# Patient Record
Sex: FEMALE | Race: BLACK OR AFRICAN AMERICAN | Employment: UNEMPLOYED | URBAN - METROPOLITAN AREA
[De-identification: names, ages, dates, MRNs, and addresses within clinical notes are randomized per-mention and may not be internally consistent; named-entity substitution may affect disease eponyms.]

---

## 2021-03-25 ENCOUNTER — OFFICE VISIT (OUTPATIENT)
Dept: CARDIOLOGY CLINIC | Age: 61
End: 2021-03-25
Payer: COMMERCIAL

## 2021-03-25 VITALS
WEIGHT: 199.4 LBS | OXYGEN SATURATION: 97 % | HEART RATE: 58 BPM | SYSTOLIC BLOOD PRESSURE: 136 MMHG | RESPIRATION RATE: 16 BRPM | BODY MASS INDEX: 31.3 KG/M2 | HEIGHT: 67 IN | TEMPERATURE: 97.2 F | DIASTOLIC BLOOD PRESSURE: 71 MMHG

## 2021-03-25 DIAGNOSIS — R01.1 HEART MURMUR: Primary | ICD-10-CM

## 2021-03-25 PROCEDURE — 99215 OFFICE O/P EST HI 40 MIN: CPT | Performed by: INTERNAL MEDICINE

## 2021-03-25 RX ORDER — DICLOFENAC SODIUM 75 MG/1
TABLET, DELAYED RELEASE ORAL
COMMUNITY
Start: 2021-03-18 | End: 2021-07-28 | Stop reason: SDUPTHER

## 2021-03-25 RX ORDER — ESCITALOPRAM OXALATE 20 MG/1
TABLET ORAL
COMMUNITY
Start: 2021-01-09

## 2021-03-25 RX ORDER — HYDROXYZINE 25 MG/1
TABLET, FILM COATED ORAL AS NEEDED
COMMUNITY
End: 2021-05-20

## 2021-03-25 RX ORDER — ATORVASTATIN CALCIUM 10 MG/1
10 TABLET, FILM COATED ORAL DAILY
COMMUNITY
Start: 2021-03-16 | End: 2022-03-10 | Stop reason: DRUGHIGH

## 2021-03-25 NOTE — PATIENT INSTRUCTIONS
Testing Echo ( To be completed in just 1 months) Please call scheduling at [phone]  to schedule an appointment.   
 
 
**call office 3-5 days after testing is completed for results**

## 2021-03-25 NOTE — LETTER
3/25/2021 Patient: Nadia Babb YOB: 1960 Date of Visit: 3/25/2021 Reji Oliver MD 
2020 [address] [facility] [address] [place] [address] Via Fax: [phone] Dear Reji Oliver MD, Thank you for referring Ms. Nadia Babb to [address] [place] [address] for evaluation. My notes for this consultation are attached. If you have questions, please do not hesitate to call me. I look forward to following your patient along with you. Sincerely, Isac Mccauley MD

## 2021-03-25 NOTE — PROGRESS NOTES
[facility]    Ms. Fina Mcclure is a 29-year-old female with a history of depression, hyperlipidemia    Patient is here today for initial cardiac evaluation. She denies any prior history of MI or CHF. She was told that she has a cardiac murmur  She said that she was told she has a cardiac murmur as a childhood. She denies any palpitation, presyncope or syncope. She has a mother who had 2 valve replaced. She is concerned with her valve problems. She denies any PND or lower extremity swelling. She denies any symptoms that is concerning for angina or heart failure. She denies presyncope or syncope. She has some fatigue and tiredness but other than that no specific cardiac symptoms  Denies any nausea, vomiting, abdominal pain, fever, chills, sputum production. No hematuria or other bleeding complaints    Past Medical History:   Diagnosis Date    Depression     HLD (hyperlipidemia)     Tobacco abuse, in remission        Review of Systems:  Cardiac symptoms as noted above in HPI. All others negative. Denies skin rash, joint pain, blurring vision, photophobia, neck pain, hemoptysis, chronic cough, nausea, vomiting, hematuria, burning micturition, BRBPR, chronic headaches. Current Outpatient Medications   Medication Sig    atorvastatin (LIPITOR) 10 mg tablet Take 10 mg by mouth daily.  diclofenac EC (VOLTAREN) 75 mg EC tablet diclofenac sodium 75 mg tablet,delayed release   take 1 tablet by mouth twice a day    hydrOXYzine HCL (ATARAX) 25 mg tablet as needed.  escitalopram oxalate (LEXAPRO) 20 mg tablet escitalopram 20 mg tablet   take 1 tablet by mouth once daily     No current facility-administered medications for this visit. No past surgical history on file. Allergies and Sensitivities:  Allergies   Allergen Reactions    Pcn [Penicillins] Hives       Family History:  No family history on file.     Social History:  Social History     Tobacco Use    Smoking status: Former Smoker    Smokeless tobacco: Never Used   Substance Use Topics    Alcohol use: Yes     Frequency: 4 or more times a week     Drinks per session: 1 or 2    Drug use: Not on file     She  reports that she has quit smoking. She has never used smokeless tobacco.  She  reports current alcohol use. Physical Exam:  BP Readings from Last 3 Encounters:   03/25/21 136/71         Pulse Readings from Last 3 Encounters:   03/25/21 (!) 58          Wt Readings from Last 3 Encounters:   03/25/21 199 lb 6.4 oz (90.4 kg)       Constitutional: Oriented to person, place, and time. HENT: Head: Normocephalic and atraumatic. Eyes: Conjunctivae and extraocular motions are normal.   Neck: No JVD present. Carotid bruit is not appreciated. Cardiovascular: Regular rhythm. No murmur, gallop or rubs appreciated  Lung: Breath sounds normal. No respiratory distress. No ronchi or rales appreciated  Abdominal: No tenderness. No rebound and no guarding. Musculoskeletal: There is no lower extremity edema. No cynosis  Lymphadenopathy:  No cervical or supraclavicular adenopathy appriciated. Neurological: No gross motor deficit noted. Skin: No visible skin rash noted. No Ear discharge noted  Psychiatric: Normal mood and affect.    Good distal pulse    LABS:   @  Lab Results   Component Value Date/Time    WBC 4.1 03/05/2021 12:49 PM    HGB 13.2 03/05/2021 12:49 PM    HCT 42.1 03/05/2021 12:49 PM    PLATELET 117 20/12/2789 12:49 PM    MCV 89.4 03/05/2021 12:49 PM     Lab Results   Component Value Date/Time    Sodium 141 03/05/2021 12:49 PM    Potassium 3.9 03/05/2021 12:49 PM    Chloride 107 03/05/2021 12:49 PM    CO2 28 03/05/2021 12:49 PM    Glucose 80 03/05/2021 12:49 PM    BUN 15 03/05/2021 12:49 PM    Creatinine 0.9 03/05/2021 12:49 PM     Lipids Latest Ref Rng & Units 3/5/2021   Chol, Total 140 - 199 mg/dl 252(H)   HDL 40 - 96 mg/dl 53   LDL 0 - 130 mg/dl 178(H) Trig 29 - 150 mg/dl 104   Chol/HDL Ratio 0.0 - 4.4 Ratio 4. 8(H)     Lab Results   Component Value Date/Time    ALT (SGPT) 24 03/05/2021 12:49 PM     Lab Results   Component Value Date/Time    Hemoglobin A1c 5.4 03/05/2021 12:49 PM     Lab Results   Component Value Date/Time    TSH 0.675 03/05/2021 12:49 PM       EKG  12/2020: Sinus rhythm at 54 bpm.  No heart block or pause. No ST changes of ischemia    ECHO    STRESS TEST (EST, PHARM, NUC, ECHO etc)    CATHETERIZATION    IMPRESSION & PLAN:  56-year-old female with depression and hyperlipidemia    Hyperlipidemia: This is being managed by PCP. She recently has been started on atorvastatin. Recommend to continue same. Cardiac murmur:  According to patient she was told that she has a cardiac murmur since childhood. On exam today I was not able to appreciate significant murmur  She may have a innocent physiologic murmur  Considering strong family history of significant valvular heart disease, would proceed with echocardiogram to make sure she does not have any valvular heart disease    Currently patient does not appear to have any symptoms concerning for angina or heart failure. No evidence of fluid overload on exam.    Importance of diet and exercise was discussed with patient. This plan was discussed with patient who is in agreement. Thank you for allowing me to participate in patient care. Please feel free to call me if you have any question or concern. Mee Gaines MD  Please note: This document has been produced using voice recognition software. Unrecognized errors in transcription may be present.

## 2021-03-25 NOTE — PROGRESS NOTES
Silvana Murphy presents today for   Chief Complaint   Patient presents with    New Patient     murmur       Silvana Murphy preferred language for health care discussion is english/other. Personal Protective Equipment:   Personal Protective Equipment was used including: mask-surgical and hands-gloves. Patient was placed on no precaution(s). Patient was masked. Is someone accompanying this pt? no    Is the patient using any DME equipment during [address]? no    Depression Screening:  3 most recent PHQ Screens 3/25/2021   Little interest or pleasure in doing things More than half the days   Feeling down, depressed, irritable, or hopeless More than half the days   Total Score PHQ 2 4   Trouble falling or staying asleep, or sleeping too much More than half the days   Feeling tired or having little energy Several days   Poor appetite, weight loss, or overeating More than half the days   Trouble concentrating on things such as school, work, reading, or watching TV Nearly every day   Moving or speaking so slowly that other people could have noticed; or the opposite being so fidgety that others notice More than half the days   Thoughts of being better off dead, or hurting yourself in some way Not at all   How difficult have these problems made it for you to do your work, take care of your home and get along with others Not difficult at all       Learning Assessment:  No flowsheet data found. Abuse Screening:  No flowsheet data found. Fall Risk  No flowsheet data found. Pt currently taking Anticoagulant therapy? no    Coordination of Care:  1. Have you been to the ER, urgent care clinic since your last visit? Hospitalized since your last visit? no    2. Have you seen or consulted any other health care providers outside of the [address] [place] [address] since your last visit? Include any pap smears or colon screening.  no

## 2021-06-24 ENCOUNTER — OFFICE VISIT (OUTPATIENT)
Dept: CARDIOLOGY CLINIC | Age: 61
End: 2021-06-24
Payer: COMMERCIAL

## 2021-06-24 VITALS
HEART RATE: 71 BPM | TEMPERATURE: 97.3 F | SYSTOLIC BLOOD PRESSURE: 118 MMHG | RESPIRATION RATE: 16 BRPM | WEIGHT: 200 LBS | OXYGEN SATURATION: 98 % | BODY MASS INDEX: 31.32 KG/M2 | DIASTOLIC BLOOD PRESSURE: 58 MMHG

## 2021-06-24 DIAGNOSIS — E78.00 PURE HYPERCHOLESTEROLEMIA: ICD-10-CM

## 2021-06-24 DIAGNOSIS — I38 VHD (VALVULAR HEART DISEASE): Primary | ICD-10-CM

## 2021-06-24 PROCEDURE — 99213 OFFICE O/P EST LOW 20 MIN: CPT | Performed by: INTERNAL MEDICINE

## 2021-06-24 NOTE — LETTER
6/24/2021    Patient: Lizzette Russell   YOB: 1960   Date of Visit: 6/24/2021     Orlando Sellers MD  [address] [place] [address] [address] [id number]  Via Fax: [phone]    Dear Orlando Sellers MD,      Thank you for referring Ms. Lisandra Mcdonald to [address] [place] [address] for evaluation. My notes for this consultation are attached. If you have questions, please do not hesitate to call me. I look forward to following your patient along with you.       Sincerely,    Alhaji Ferreira MD

## 2021-06-24 NOTE — PROGRESS NOTES
Juanpablo Jewell presents today for   Chief Complaint   Patient presents with   [address] [facility] preferred language for health care discussion is english/other. Personal Protective Equipment:   Personal Protective Equipment was used including: mask-surgical and hands-gloves. Patient was placed on no precaution(s). Patient was masked. Precautions:   Patient currently on None  Patient currently roomed with door closed    Is someone accompanying this pt? no    Is the patient using any DME equipment during [address]? no    Depression Screening:  3 most recent PHQ Screens 6/24/2021   Little interest or pleasure in doing things Not at all   Feeling down, depressed, irritable, or hopeless Not at all   Total Score PHQ 2 0   Trouble falling or staying asleep, or sleeping too much -   Feeling tired or having little energy -   Poor appetite, weight loss, or overeating -   Trouble concentrating on things such as school, work, reading, or watching TV -   Moving or speaking so slowly that other people could have noticed; or the opposite being so fidgety that others notice -   Thoughts of being better off dead, or hurting yourself in some way -   How difficult have these problems made it for you to do your work, take care of your home and get along with others -       Learning Assessment:  No flowsheet data found. Abuse Screening:  Completed    Fall Risk  Completed    Pt currently taking Anticoagulant therapy? no    Coordination of Care:  1. Have you been to the ER, urgent care clinic since your last visit? Hospitalized since your last visit? no    2. Have you seen or consulted any other health care providers outside of the [address] [place] [address] since your last visit? Include any pap smears or colon screening.  no

## 2021-06-24 NOTE — PROGRESS NOTES
[facility]    Ms. Carolin Ng is a 63 y.o. female with a history of depression, hyperlipidemia    Patient is here today for follow-up cardiac evaluation. She denies any prior history of MI or CHF. She was told that she has a cardiac murmur  She has undergone echocardiogram since last visit. She denies any PND or lower extremity swelling. She denies any symptoms that is concerning for angina or heart failure. She denies presyncope or syncope. She has some fatigue and tiredness but other than that no specific cardiac symptoms  Denies any nausea, vomiting, abdominal pain, fever, chills, sputum production. No hematuria or other bleeding complaints    Past Medical History:   Diagnosis Date    Depression     Heart murmur     HLD (hyperlipidemia)     Sleep apnea     does not use cpap    Tobacco abuse, in remission        Review of Systems:  Cardiac symptoms as noted above in HPI. All others negative. Denies skin rash, joint pain, blurring vision, photophobia, neck pain, hemoptysis, chronic cough, nausea, vomiting, hematuria, burning micturition, BRBPR, chronic headaches. Current Outpatient Medications   Medication Sig    atorvastatin (LIPITOR) 10 mg tablet Take 10 mg by mouth daily.  bisacodyL (Dulcolax, bisacodyl,) 5 mg EC tablet Dulcolax (bisacodyl) 5 mg tablet,delayed release   Take 2 TABLETS as directed in written instructions for bowel prep    ergocalciferol (ERGOCALCIFEROL) 1,250 mcg (50,000 unit) capsule Vitamin D2 1,250 mcg (50,000 unit) capsule   take 1 capsule by mouth every week    traZODone (DESYREL) 50 mg tablet trazodone 50 mg tablet   take 1 tablet by mouth at bedtime    b complex vitamins tablet Take 1 Tablet by mouth daily.  ascorbic acid/collagen hydr (COLLAGEN PLUS VITAMIN C PO) Take  by mouth daily.  coenzyme Q-10 (Co Q-10) 200 mg capsule Take  by mouth daily.     diclofenac EC (VOLTAREN) 75 mg EC tablet diclofenac sodium 75 mg tablet,delayed release   take 1 tablet by mouth twice a day    escitalopram oxalate (LEXAPRO) 20 mg tablet escitalopram 20 mg tablet   take 1 tablet by mouth once daily     No current facility-administered medications for this visit. Past Surgical History:   Procedure Laterality Date    COLONOSCOPY N/A 5/21/2021    SCREENING COLONOSCOPY with hot snared polypectomy performed by Howard Zaman MD at [address] [place] [address] HX COLONOSCOPY      HX HEENT Left     retina re-attached & macular hole repair    HX HYSTERECTOMY      HX MYOMECTOMY         Allergies and Sensitivities:  Allergies   Allergen Reactions    Penicillins Hives       Family History:  No family history on file. Social History:  Social History     Tobacco Use    Smoking status: Former Smoker    Smokeless tobacco: Never Used   Substance Use Topics    Alcohol use: Yes    Drug use: Not on file     She  reports that she has quit smoking. She has never used smokeless tobacco.  She  reports current alcohol use. Physical Exam:  BP Readings from Last 3 Encounters:   06/24/21 (!) 118/58   05/21/21 110/64   05/17/21 (!) 146/75         Pulse Readings from Last 3 Encounters:   06/24/21 71   05/21/21 64   03/25/21 (!) 58          Wt Readings from Last 3 Encounters:   06/24/21 90.7 kg (200 lb)   05/21/21 89 kg (196 lb 3.4 oz)   05/17/21 90.3 kg (199 lb)       Constitutional: Oriented to person, place, and time. HENT: Head: Normocephalic and atraumatic. Neck: No JVD present. Carotid bruit is not appreciated. Cardiovascular: Regular rhythm. No  gallop or rubs appreciated. Systolic ejection murmur 2/6 heard mostly on left sternal border  Lung: Breath sounds normal. No respiratory distress. No ronchi or rales appreciated  Abdominal: No tenderness. No rebound and no guarding. Musculoskeletal: There is no lower extremity edema. No cynosis  Lymphadenopathy:  No cervical or supraclavicular adenopathy appriciated. Neurological: No gross motor deficit noted. Skin: No visible skin rash noted. No Ear discharge noted  Psychiatric: Normal mood and affect. Good distal pulse    LABS:   @  Lab Results   Component Value Date/Time    WBC 3.8 (L) 06/21/2021 10:59 AM    HGB 12.8 (L) 06/21/2021 10:59 AM    HCT 41.1 06/21/2021 10:59 AM    PLATELET 238 25/68/7238 10:59 AM    MCV 87.8 06/21/2021 10:59 AM     Lab Results   Component Value Date/Time    Sodium 143 06/21/2021 10:59 AM    Potassium 4.0 06/21/2021 10:59 AM    Chloride 110 (H) 06/21/2021 10:59 AM    CO2 28 06/21/2021 10:59 AM    Glucose 86 06/21/2021 10:59 AM    BUN 13 06/21/2021 10:59 AM    Creatinine 0.9 06/21/2021 10:59 AM     Lipids Latest Ref Rng & Units 6/21/2021 3/5/2021   Chol, Total 140 - 199 mg/dl 226(H) 252(H)   HDL 40 - 96 mg/dl 50 53   LDL 0 - 130 mg/dl 159(H) 178(H)   Trig 29 - 150 mg/dl 86 104   Chol/HDL Ratio 0.0 - 4.4 Ratio 4. 5(H) 4. 8(H)     Lab Results   Component Value Date/Time    ALT (SGPT) 52 06/21/2021 10:59 AM     Lab Results   Component Value Date/Time    Hemoglobin A1c 5.4 03/05/2021 12:49 PM     Lab Results   Component Value Date/Time    TSH 0.675 03/05/2021 12:49 PM       EKG  12/2020: Sinus rhythm at 54 bpm.  No heart block or pause. No ST changes of ischemia    ECHO (05/2021)  Left Ventricle Normal cavity size, wall thickness and systolic function (ejection fraction normal). The estimated EF is 55 - 60%. Visually measured ejection fraction. There is age-appropriate left ventricular diastolic function. Wall Scoring The left ventricular wall motion is normal.            Left Atrium Mildly dilated left atrium. Left Atrium volume index is 39.65 mL/m2. Right Ventricle Normal cavity size and global systolic function. Right Atrium Normal cavity size. Aortic Valve No stenosis. Probably trileaflet aortic valve. Mild aortic valve sclerosis with no evidence of reduced excursion. Mild aortic valve regurgitation. Mitral Valve No stenosis.  Mitral valve non-specific thickening. Mild regurgitation. Tricuspid Valve Normal valve structure and no stenosis. Mild regurgitation. Pulmonic Valve Normal valve structure and no stenosis. Mild to moderate regurgitation. Aorta Normal aortic root and ascending aorta. Pulmonary Artery Pulmonary arterial systolic pressure (PASP) is 45 mmHg. Pulmonary hypertension found to be mild. IVC/Hepatic Veins Normal central venous pressure (3 mmHg); IVC diameter is less than 21 mm and collapses more than 50% with respiration. STRESS TEST (EST, PHARM, NUC, ECHO etc)    CATHETERIZATION    IMPRESSION & PLAN:  63 y.o. female with depression and hyperlipidemia    Hyperlipidemia: This is being managed by PCP. She recently has been started on atorvastatin in 03/2021. Recent lipid profile noted. Discussed with the patient that dietary modification needed  She admits that she is taking atorvastatin probably only 3 times at a 1 week. She is going to make extra effort to take the medication on a daily basis. Repeat lipid profile in 3 months after taking medications regularly as prescribed every day    Valvular heart disease:  Echo in 05/2021 showed mild AI, mild TR, mild MR and UT  Discussed finding with the patient in detail today. Signs and symptoms of valvular heart disease and congestive heart failure discussed. Currently she has no cardiac symptoms. We recommend that she will need a repeat echocardiogram in 12 months to check on progression    Currently patient does not appear to have any symptoms concerning for angina or heart failure. No evidence of fluid overload on exam.    Importance of diet and exercise was discussed with patient. This plan was discussed with patient who is in agreement. Thank you for allowing me to participate in patient care. Please feel free to call me if you have any question or concern. Vale Pham MD  Please note: This document has been produced using voice recognition software. Unrecognized errors in transcription may be present.

## 2021-07-28 ENCOUNTER — OFFICE VISIT (OUTPATIENT)
Dept: FAMILY MEDICINE CLINIC | Age: 61
End: 2021-07-28
Payer: COMMERCIAL

## 2021-07-28 VITALS
WEIGHT: 201 LBS | RESPIRATION RATE: 16 BRPM | DIASTOLIC BLOOD PRESSURE: 74 MMHG | OXYGEN SATURATION: 99 % | HEIGHT: 67 IN | SYSTOLIC BLOOD PRESSURE: 115 MMHG | TEMPERATURE: 97.3 F | BODY MASS INDEX: 31.55 KG/M2 | HEART RATE: 65 BPM

## 2021-07-28 DIAGNOSIS — K21.9 GASTROESOPHAGEAL REFLUX DISEASE, UNSPECIFIED WHETHER ESOPHAGITIS PRESENT: Primary | ICD-10-CM

## 2021-07-28 DIAGNOSIS — M17.11 PRIMARY OSTEOARTHRITIS OF RIGHT KNEE: ICD-10-CM

## 2021-07-28 DIAGNOSIS — M85.80 OSTEOPENIA, UNSPECIFIED LOCATION: ICD-10-CM

## 2021-07-28 DIAGNOSIS — E55.9 VITAMIN D DEFICIENCY: ICD-10-CM

## 2021-07-28 PROCEDURE — 99203 OFFICE O/P NEW LOW 30 MIN: CPT | Performed by: NURSE PRACTITIONER

## 2021-07-28 RX ORDER — DICLOFENAC SODIUM 75 MG/1
TABLET, DELAYED RELEASE ORAL
Qty: 60 TABLET | Refills: 3 | Status: SHIPPED | OUTPATIENT
Start: 2021-07-28

## 2021-07-28 NOTE — PROGRESS NOTES
Ingrid Ann is a 63 y.o. female (: 1960) presenting to address:    Chief Complaint   Patient presents with    GI Problem    Joint Pain     needs refill on Diclofenac        Vitals:    21 0801   BP: 115/74   Pulse: 65   Resp: 16   Temp: 97.3 °F (36.3 °C)   TempSrc: Temporal   SpO2: 99%   Weight: 201 lb (91.2 kg)   Height: 5' 6.5\" (1.689 m)   PainSc:   0 - No pain       Hearing/Vision:   No exam data present    Learning Assessment:   No flowsheet data found. Depression Screening:     3 most recent PHQ Screens 2021   Little interest or pleasure in doing things Not at all   Feeling down, depressed, irritable, or hopeless Several days   Total Score PHQ 2 1   Trouble falling or staying asleep, or sleeping too much -   Feeling tired or having little energy -   Poor appetite, weight loss, or overeating -   Trouble concentrating on things such as school, work, reading, or watching TV -   Moving or speaking so slowly that other people could have noticed; or the opposite being so fidgety that others notice -   Thoughts of being better off dead, or hurting yourself in some way -   How difficult have these problems made it for you to do your work, take care of your home and get along with others -     Fall Risk Assessment:     Fall Risk Assessment, last 12 mths 2021   Able to walk? Yes   Fall in past 12 months? 1   Do you feel unsteady? 0   Are you worried about falling 1   Is TUG test greater than 12 seconds? 0   Is the gait abnormal? 0   Number of falls in past 12 months 1   Fall with injury? 1     Abuse Screening:     Abuse Screening Questionnaire 2021   Do you ever feel afraid of your partner? N   Are you in a relationship with someone who physically or mentally threatens you? N   Is it safe for you to go home? Y     Coordination of Care Questionaire:   1. Have you been to the ER, urgent care clinic since your last visit? Hospitalized since your last visit? NO    2.  Have you seen or consulted any other health care providers outside of the [address] [place] [address] since your last visit? Include any pap smears or colon screening. NO    Advanced Directive:   1. Do you have an Advanced Directive? NO    2. Would you like information on Advanced Directives?  NO

## 2021-07-28 NOTE — PROGRESS NOTES
HPI  Missael Simpson is a 63y.o. year old female who presents today to establish care. She has had ongoing issues with acid reflux/heartburn for a while and takes Tagamet regularly which seems to work the best for her. Had a colonoscopy back in May but does not see GI at all. Had US of abdomen done at the beginning of the month which showed a liver lesion that her old PCP told her needed further evaluation. Has right knee osteoarthritis and saw ortho in march who prescribed oral Voltaren which helps tremendously. Wanted to pursue conservative treatment. Unsure if she needs a new script for RX strength weekly D. She has started taking daily supplement. Told in the past by old PCP in [facility] that she had osteopenia. Past Medical History:   Diagnosis Date    Depression     HLD (hyperlipidemia)     Sleep apnea     does not use cpap    Tobacco abuse, in remission        Past Surgical History:   Procedure Laterality Date    COLONOSCOPY N/A 5/21/2021    SCREENING COLONOSCOPY with hot snared polypectomy performed by Angela Andrews MD at [address] [place] [address] HX COLONOSCOPY      HX HEENT Left     retina re-attached & macular hole repair    HX HYSTERECTOMY      HX MYOMECTOMY         Social History     Tobacco Use    Smoking status: Former Smoker    Smokeless tobacco: Never Used   Vaping Use    Vaping Use: Never used   Substance Use Topics    Alcohol use: Yes     Alcohol/week: 6.0 standard drinks     Types: 6 Glasses of wine per week    Drug use: Never         Current Outpatient Medications:     diclofenac EC (VOLTAREN) 75 mg EC tablet, Take 1 tablet by mouth twice a day, Disp: 60 Tablet, Rfl: 3    ergocalciferol (ERGOCALCIFEROL) 1,250 mcg (50,000 unit) capsule, Vitamin D2 1,250 mcg (50,000 unit) capsule  take 1 capsule by mouth every week, Disp: , Rfl:     traZODone (DESYREL) 50 mg tablet, as needed. , Disp: , Rfl:     b complex vitamins tablet, Take 1 Tablet by mouth daily. , Disp: , Rfl:     ascorbic acid/collagen hydr (COLLAGEN PLUS VITAMIN C PO), Take  by mouth daily. , Disp: , Rfl:     coenzyme Q-10 (Co Q-10) 200 mg capsule, Take  by mouth daily. , Disp: , Rfl:     atorvastatin (LIPITOR) 10 mg tablet, Take 10 mg by mouth daily. , Disp: , Rfl:     escitalopram oxalate (LEXAPRO) 20 mg tablet, escitalopram 20 mg tablet  take 1 tablet by mouth once daily, Disp: , Rfl:     bisacodyL (Dulcolax, bisacodyl,) 5 mg EC tablet, Dulcolax (bisacodyl) 5 mg tablet,delayed release  Take 2 TABLETS as directed in written instructions for bowel prep (Patient not taking: Reported on 7/28/2021), Disp: , Rfl:      Allergies   Allergen Reactions    Penicillins Hives       Review of Systems   Constitutional: Negative for chills, fever and malaise/fatigue. Respiratory: Negative for cough and shortness of breath. Cardiovascular: Negative for chest pain, palpitations and leg swelling. Gastrointestinal: Positive for heartburn. Negative for abdominal pain, constipation, diarrhea, nausea and vomiting.        + bloating   Musculoskeletal: Positive for joint pain (right knee OA). PE  /74   Pulse 65   Temp 97.3 °F (36.3 °C) (Temporal)   Resp 16   Ht 5' 6.5\" (1.689 m)   Wt 201 lb (91.2 kg)   SpO2 99%   BMI 31.96 kg/m²     Physical Exam  Vitals reviewed. Constitutional:       General: She is not in acute distress. Appearance: Normal appearance. HENT:      Head: Normocephalic and atraumatic. Cardiovascular:      Rate and Rhythm: Normal rate and regular rhythm. Heart sounds: S1 normal and S2 normal. No murmur heard. No friction rub. No gallop. No S4 sounds. Pulmonary:      Effort: Pulmonary effort is normal.      Breath sounds: Normal breath sounds. No wheezing, rhonchi or rales. Abdominal:      General: Bowel sounds are normal. There is no distension. Palpations: Abdomen is soft. There is no hepatomegaly or splenomegaly. Tenderness:  There is no abdominal tenderness. There is no guarding or rebound. Musculoskeletal:      Right lower leg: No edema. Left lower leg: No edema. Skin:     General: Skin is warm and dry. Capillary Refill: Capillary refill takes less than 2 seconds. Neurological:      Mental Status: She is alert and oriented to person, place, and time. Assessment/Plan  1. GERD  Continue tagamet  Work on tapering off of Diclofenac and avoid NSAIDs as able  Avoid trigger foods  Small meals  No laying down after eating for at least 1-2 hours  Refer to GI for further evaluation    2. R knee OA  Decrease PO Voltaren  OTC topical Voltaren PRN  FU with ortho if needed  Voltaren refilled    3.  Osteopenia, vitamin D deficiency  DEXA

## 2021-08-04 ENCOUNTER — HOSPITAL ENCOUNTER (OUTPATIENT)
Dept: BONE DENSITY | Age: 61
Discharge: HOME | End: 2021-08-04
Attending: NURSE PRACTITIONER
Payer: COMMERCIAL

## 2021-08-04 DIAGNOSIS — M85.89 DISAPPEARING BONE DISEASE: ICD-10-CM

## 2021-08-04 DIAGNOSIS — M85.80 OSTEOPENIA, UNSPECIFIED LOCATION: ICD-10-CM

## 2021-08-04 PROCEDURE — 77080 DXA BONE DENSITY AXIAL: CPT

## 2021-08-05 RX ORDER — ALENDRONATE SODIUM 70 MG/1
70 TABLET ORAL
Qty: 4 TABLET | Refills: 11 | Status: SHIPPED | OUTPATIENT
Start: 2021-08-05 | End: 2022-07-29

## 2021-08-05 NOTE — PROGRESS NOTES
Call - bone density scan shows osteoporosis. Would like to start her on a once a week medication called Fosamax. Needs to take it with a full 8oz glass of water and sit upright for at least 30 minutes after taking otherwise she can get bad acid reflux.

## 2021-08-18 ENCOUNTER — DOCUMENTATION ONLY (OUTPATIENT)
Dept: PULMONOLOGY | Age: 61
End: 2021-08-18

## 2022-03-10 ENCOUNTER — OFFICE VISIT (OUTPATIENT)
Dept: CARDIOLOGY CLINIC | Age: 62
End: 2022-03-10
Payer: COMMERCIAL

## 2022-03-10 VITALS
SYSTOLIC BLOOD PRESSURE: 124 MMHG | OXYGEN SATURATION: 98 % | DIASTOLIC BLOOD PRESSURE: 63 MMHG | RESPIRATION RATE: 16 BRPM | HEART RATE: 63 BPM | TEMPERATURE: 97.2 F | BODY MASS INDEX: 32.75 KG/M2 | WEIGHT: 206 LBS

## 2022-03-10 DIAGNOSIS — I38 VHD (VALVULAR HEART DISEASE): ICD-10-CM

## 2022-03-10 DIAGNOSIS — R01.1 HEART MURMUR: Primary | ICD-10-CM

## 2022-03-10 DIAGNOSIS — E78.00 PURE HYPERCHOLESTEROLEMIA: ICD-10-CM

## 2022-03-10 PROCEDURE — 99214 OFFICE O/P EST MOD 30 MIN: CPT | Performed by: INTERNAL MEDICINE

## 2022-03-10 RX ORDER — ATORVASTATIN CALCIUM 20 MG/1
20 TABLET, FILM COATED ORAL DAILY
Qty: 90 TABLET | Refills: 3 | Status: SHIPPED | OUTPATIENT
Start: 2022-03-10

## 2022-03-10 NOTE — PROGRESS NOTES
Identified pt with two pt identifiers(name and ). Reviewed record in preparation for visit and have obtained necessary documentation. David Phelps presents today for   Chief Complaint   Patient presents with    Follow-up     9m       Pt c/o FATIGUE/WEAKNESS. David Phelps preferred language for health care discussion is english/other. Personal Protective Equipment:   Personal Protective Equipment was used including: mask-surgical and hands-gloves. Patient was placed on no precaution(s). Patient was masked. Precautions:   Patient currently on None  Patient currently roomed with door closed. Is someone accompanying this pt? no    Is the patient using any DME equipment during  [address]? no    Depression Screening:  3 most recent PHQ Screens 2021   Little interest or pleasure in doing things Not at all   Feeling down, depressed, irritable, or hopeless Several days   Total Score PHQ 2 1   Trouble falling or staying asleep, or sleeping too much More than half the days   Feeling tired or having little energy Several days   Poor appetite, weight loss, or overeating More than half the days   Feeling bad about yourself - or that you are a failure or have let yourself or your family down More than half the days   Trouble concentrating on things such as school, work, reading, or watching TV Not at all   Moving or speaking so slowly that other people could have noticed; or the opposite being so fidgety that others notice Not at all   Thoughts of being better off dead, or hurting yourself in some way Not at all   PHQ 9 Score 8   How difficult have these problems made it for you to do your work, take care of your home and get along with others -       Learning Assessment:  No flowsheet data found. Abuse Screening:  Abuse Screening Questionnaire 2021   Do you ever feel afraid of your partner? N   Are you in a relationship with someone who physically or mentally threatens you?  N   Is it safe for you to go home? Y       Fall Risk  Fall Risk Assessment, last 12 mths 7/28/2021   Able to walk? Yes   Fall in past 12 months? 1   Do you feel unsteady? 0   Are you worried about falling 1   Is TUG test greater than 12 seconds? 0   Is the gait abnormal? 0   Number of falls in past 12 months 1   Fall with injury? 1       Pt currently taking Anticoagulant therapy? no  Pt currently taking Antiplatelet therapy? no    Coordination of Care:  1. Have you been to the ER, urgent care clinic since your last visit? Hospitalized since your last visit? no    2. Have you seen or consulted any other health care providers outside of the [address] [place] [address] since your last visit? Include any pap smears or colon screening. no      Please see Red banners under Allergies and Med Rec to remove outside inquires. All correct information has been verified with patient and added to chart.      Medication's patient's would liked removed has been marked not taking to be removed per Verbal order and read back per Anjali Valero MD

## 2022-03-10 NOTE — PROGRESS NOTES
[facility]    Ms. Ankur Luis is a 57 y.o. female with a history of depression, hyperlipidemia    Patient is here today for follow-up cardiac evaluation. She denies any prior history of MI or CHF. She denies any PND or lower extremity swelling. She denies any symptoms that is concerning for angina or heart failure. She denies presyncope or syncope. She admits to sedentary lifestyle. She does not perform regular exercise  Denies any nausea, vomiting, abdominal pain, fever, chills, sputum production. No hematuria or other bleeding complaints    Past Medical History:   Diagnosis Date    Depression     HLD (hyperlipidemia)     Sleep apnea     does not use cpap    Tobacco abuse, in remission        Review of Systems:  Cardiac symptoms as noted above in HPI. All others negative. Denies skin rash, joint pain, blurring vision, photophobia, neck pain, hemoptysis, chronic cough, nausea, vomiting, hematuria, burning micturition, BRBPR, chronic headaches. Current Outpatient Medications   Medication Sig    coenzyme Q-10 (Co Q-10) 200 mg capsule Take  by mouth daily.  atorvastatin (LIPITOR) 10 mg tablet Take 10 mg by mouth daily.  alendronate (FOSAMAX) 70 mg tablet Take 1 Tablet by mouth every seven (7) days for 52 doses.  diclofenac EC (VOLTAREN) 75 mg EC tablet Take 1 tablet by mouth twice a day    bisacodyL (Dulcolax, bisacodyl,) 5 mg EC tablet Dulcolax (bisacodyl) 5 mg tablet,delayed release   Take 2 TABLETS as directed in written instructions for bowel prep (Patient not taking: Reported on 7/28/2021)    ergocalciferol (ERGOCALCIFEROL) 1,250 mcg (50,000 unit) capsule Vitamin D2 1,250 mcg (50,000 unit) capsule   take 1 capsule by mouth every week    traZODone (DESYREL) 50 mg tablet as needed.  b complex vitamins tablet Take 1 Tablet by mouth daily.     ascorbic acid/collagen hydr (COLLAGEN PLUS VITAMIN C PO) Take  by mouth daily.    escitalopram oxalate (LEXAPRO) 20 mg tablet escitalopram 20 mg tablet   take 1 tablet by mouth once daily     No current facility-administered medications for this visit. Past Surgical History:   Procedure Laterality Date    COLONOSCOPY N/A 5/21/2021    SCREENING COLONOSCOPY with hot snared polypectomy performed by Georgette Friend MD at [address] [place] [address] HX COLONOSCOPY      HX HEENT Left     retina re-attached & macular hole repair    HX HYSTERECTOMY      HX MYOMECTOMY         Allergies and Sensitivities:  Allergies   Allergen Reactions    Penicillins Hives       Family History:  Family History   Problem Relation Age of Onset    Glaucoma Mother     Arthritis Mother     High Cholesterol Mother     Glaucoma Father     Arthritis Father     Prostate Cancer Father     Eczema Sister     High Cholesterol Sister     Arthritis Sister     Pancreatic Cancer Paternal Aunt     Breast Cancer Paternal Aunt     Prostate Cancer Paternal Uncle        Social History:  Social History     Tobacco Use    Smoking status: Former Smoker    Smokeless tobacco: Never Used   Vaping Use    Vaping Use: Never used   Substance Use Topics    Alcohol use: Yes     Alcohol/week: 6.0 standard drinks     Types: 6 Glasses of wine per week    Drug use: Never     She  reports that she has quit smoking. She has never used smokeless tobacco.  She  reports current alcohol use of about 6.0 standard drinks of alcohol per week. Physical Exam:  BP Readings from Last 3 Encounters:   03/10/22 124/63   07/28/21 115/74   06/24/21 (!) 118/58         Pulse Readings from Last 3 Encounters:   03/10/22 63   07/28/21 65   06/24/21 71          Wt Readings from Last 3 Encounters:   03/10/22 93.4 kg (206 lb)   07/28/21 91.2 kg (201 lb)   06/24/21 90.7 kg (200 lb)       Constitutional: Oriented to person, place, and time. HENT: Head: Normocephalic and atraumatic. Neck: No JVD present. Cardiovascular: Regular rhythm.    No gallop or rubs appreciated. No obvious murmur appreciated today  Lung: Breath sounds normal. No respiratory distress. No ronchi or rales appreciated  Abdominal: No tenderness. No rebound and no guarding. Musculoskeletal: There is no lower extremity edema. No cynosis    LABS:   @  Lab Results   Component Value Date/Time    WBC 3.8 (L) 06/21/2021 10:59 AM    HGB 12.8 (L) 06/21/2021 10:59 AM    HCT 41.1 06/21/2021 10:59 AM    PLATELET 355 88/23/6149 10:59 AM    MCV 87.8 06/21/2021 10:59 AM     Lab Results   Component Value Date/Time    Sodium 143 06/21/2021 10:59 AM    Potassium 4.0 06/21/2021 10:59 AM    Chloride 110 (H) 06/21/2021 10:59 AM    CO2 28 06/21/2021 10:59 AM    Glucose 86 06/21/2021 10:59 AM    BUN 13 06/21/2021 10:59 AM    Creatinine 0.9 06/21/2021 10:59 AM     Lipids Latest Ref Rng & Units 6/21/2021 3/5/2021   Chol, Total 140 - 199 mg/dl 226(H) 252(H)   HDL 40 - 96 mg/dl 50 53   LDL 0 - 130 mg/dl 159(H) 178(H)   Trig 29 - 150 mg/dl 86 104   Chol/HDL Ratio 0.0 - 4.4 Ratio 4. 5(H) 4. 8(H)     Lab Results   Component Value Date/Time    ALT (SGPT) 52 06/21/2021 10:59 AM     Lab Results   Component Value Date/Time    Hemoglobin A1c 5.4 03/05/2021 12:49 PM     Lab Results   Component Value Date/Time    TSH 0.675 03/05/2021 12:49 PM       EKG  12/2020: Sinus rhythm at 54 bpm.  No heart block or pause. No ST changes of ischemia    ECHO (05/2021)  Left Ventricle Normal cavity size, wall thickness and systolic function (ejection fraction normal). The estimated EF is 55 - 60%. Visually measured ejection fraction. There is age-appropriate left ventricular diastolic function. Wall Scoring The left ventricular wall motion is normal.            Left Atrium Mildly dilated left atrium. Left Atrium volume index is 39.65 mL/m2. Right Ventricle Normal cavity size and global systolic function. Right Atrium Normal cavity size. Aortic Valve No stenosis. Probably trileaflet aortic valve.  Mild aortic valve sclerosis with no evidence of reduced excursion. Mild aortic valve regurgitation. Mitral Valve No stenosis. Mitral valve non-specific thickening. Mild regurgitation. Tricuspid Valve Normal valve structure and no stenosis. Mild regurgitation. Pulmonic Valve Normal valve structure and no stenosis. Mild to moderate regurgitation. Aorta Normal aortic root and ascending aorta. Pulmonary Artery Pulmonary arterial systolic pressure (PASP) is 45 mmHg. Pulmonary hypertension found to be mild. IVC/Hepatic Veins Normal central venous pressure (3 mmHg); IVC diameter is less than 21 mm and collapses more than 50% with respiration. STRESS TEST (EST, PHARM, NUC, ECHO etc)    CATHETERIZATION    IMPRESSION & PLAN:  57 y.o. female with depression and hyperlipidemia    Hyperlipidemia: This is being managed by PCP. She recently has been started on atorvastatin in 03/2021. Patient admits that she has not been taking atorvastatin for last 6 months. We will repeat fasting lipid profile. Will ask patient to start taking atorvastatin 20 mg daily. Importance of medication discussed    Valvular heart disease:  Echo in 05/2021 showed mild AI, mild TR, mild MR and SD  We will repeat limited echocardiogram to evaluate valvular heart disease  No fluid overload on exam today    Currently patient does not appear to have any symptoms concerning for angina or heart failure. No evidence of fluid overload on exam.    Importance of diet and exercise was discussed with patient. This plan was discussed with patient who is in agreement. Thank you for allowing me to participate in patient care. Please feel free to call me if you have any question or concern. Shen Kowalski MD  Please note: This document has been produced using voice recognition software. Unrecognized errors in transcription may be present.

## 2022-03-10 NOTE — LETTER
3/10/2022    Patient: Krys Prado   YOB: 1960   Date of Visit: 3/10/2022     Alisha Franklin NP  [address] [address]  Via Fax: [phone]    Dear Alisha Franklin NP,      Thank you for referring Ms. Camila Long to [address] [place] [address] for evaluation. My notes for this consultation are attached. If you have questions, please do not hesitate to call me. I look forward to following your patient along with you.       Sincerely,    Graciela Kang MD

## 2022-03-10 NOTE — PATIENT INSTRUCTIONS
Testing   Echo**call office 3-5 days after testing is completed for results**     New Medication/Medication Changes  lipitor 20 mg daily     **please allow 24-48 hrs for medication to be escribed to pharmacy** If you need any refills on medications please contact your pharmacy so that the request can be escribed to the provider for review.     Labs  Fasting lipid in 3-6 months    No appointment required for lab work  [address] [facility]  Hours are Mon-Fri 7:00 am-3:30 pm  **closed from 12:30 pm-1pm daily**

## 2022-12-07 ENCOUNTER — TELEPHONE (OUTPATIENT)
Dept: CARDIOLOGY CLINIC | Age: 62
End: 2022-12-07

## 2022-12-08 ENCOUNTER — OFFICE VISIT (OUTPATIENT)
Dept: CARDIOLOGY CLINIC | Age: 62
End: 2022-12-08

## 2022-12-08 VITALS
HEART RATE: 58 BPM | DIASTOLIC BLOOD PRESSURE: 68 MMHG | TEMPERATURE: 98 F | SYSTOLIC BLOOD PRESSURE: 116 MMHG | WEIGHT: 200 LBS | BODY MASS INDEX: 32.28 KG/M2

## 2022-12-08 DIAGNOSIS — E78.00 PURE HYPERCHOLESTEROLEMIA: Primary | ICD-10-CM

## 2022-12-08 RX ORDER — PANTOPRAZOLE SODIUM 20 MG/1
20 TABLET, DELAYED RELEASE ORAL DAILY
Qty: 90 TABLET | Refills: 0 | Status: SHIPPED | OUTPATIENT
Start: 2022-12-08

## 2022-12-08 RX ORDER — ATORVASTATIN CALCIUM 20 MG/1
20 TABLET, FILM COATED ORAL DAILY
Qty: 90 TABLET | Refills: 2 | Status: SHIPPED | OUTPATIENT
Start: 2022-12-08

## 2022-12-08 NOTE — LETTER
12/8/2022    Patient: Regan Chatterjee   YOB: 1960   Date of Visit: 12/8/2022     Lark Lefort, NP  [address] [address]  Via Fax: [phone]    Dear Lark Lefort, NP,      Thank you for referring Ms. Gillian Waller to [address] [place] [address] for evaluation. My notes for this consultation are attached. If you have questions, please do not hesitate to call me. I look forward to following your patient along with you.       Sincerely,    Citlali Perez MD

## 2022-12-08 NOTE — PROGRESS NOTES
[facility]    Ms. Joshua Edouard is a 60 y.o. female with a history of depression, hyperlipidemia    Patient is here today for follow-up cardiac evaluation. She denies any prior history of MI or CHF. Patient denies any symptoms concerning for angina or heart failure. She denies any lower extremity swelling. She is able to perform her usual daily living without any limitation  Patient has been experiencing some epigastric burning discomfort especially after she eats food. She cannot tell any particular type of food makes his symptoms. Feels like her reflux. Does not have any chest pain or epigastric pain with exertional activity. Past Medical History:   Diagnosis Date    Depression     HLD (hyperlipidemia)     Sleep apnea     does not use cpap    Tobacco abuse, in remission        Review of Systems:  Cardiac symptoms as noted above in HPI. All others negative. Denies skin rash, joint pain, blurring vision, photophobia, neck pain, hemoptysis, chronic cough, nausea, vomiting, hematuria, burning micturition, BRBPR, chronic headaches. Current Outpatient Medications   Medication Sig    atorvastatin (LIPITOR) 20 mg tablet Take 1 Tablet by mouth daily. diclofenac EC (VOLTAREN) 75 mg EC tablet Take 1 tablet by mouth twice a day    bisacodyL (DULCOLAX) 5 mg EC tablet Dulcolax (bisacodyl) 5 mg tablet,delayed release   Take 2 TABLETS as directed in written instructions for bowel prep (Patient not taking: No sig reported)    ergocalciferol (ERGOCALCIFEROL) 1,250 mcg (50,000 unit) capsule Vitamin D2 1,250 mcg (50,000 unit) capsule   take 1 capsule by mouth every week    traZODone (DESYREL) 50 mg tablet as needed. b complex vitamins tablet Take 1 Tablet by mouth daily. ascorbic acid/collagen hydr (COLLAGEN PLUS VITAMIN C PO) Take  by mouth daily. coenzyme Q-10 (CO Q-10) 200 mg capsule Take  by mouth daily.     escitalopram oxalate (LEXAPRO) 20 mg tablet escitalopram 20 mg tablet   take 1 tablet by mouth once daily     No current facility-administered medications for this visit. Past Surgical History:   Procedure Laterality Date    COLONOSCOPY N/A 5/21/2021    SCREENING COLONOSCOPY with hot snared polypectomy performed by Jovon Gutierrez MD at [facility] ENDOSCOPY    HX COLONOSCOPY      HX HEENT Left     retina re-attached & macular hole repair    HX HYSTERECTOMY      HX MYOMECTOMY         Allergies and Sensitivities:  Allergies   Allergen Reactions    Penicillins Hives       Family History:  Family History   Problem Relation Age of Onset    Glaucoma Mother     Arthritis Mother     High Cholesterol Mother     Glaucoma Father     Arthritis Father     Prostate Cancer Father     Eczema Sister     High Cholesterol Sister     Arthritis Sister     Pancreatic Cancer Paternal Aunt     Breast Cancer Paternal Aunt     Prostate Cancer Paternal Uncle        Social History:  Social History     Tobacco Use    Smoking status: Former    Smokeless tobacco: Never   Vaping Use    Vaping Use: Never used   Substance Use Topics    Alcohol use: Yes     Alcohol/week: 6.0 standard drinks     Types: 6 Glasses of wine per week    Drug use: Never     She  reports that she has quit smoking. She has never used smokeless tobacco.  She  reports current alcohol use of about 6.0 standard drinks per week. Physical Exam:  BP Readings from Last 3 Encounters:   03/10/22 124/63   07/28/21 115/74   06/24/21 (!) 118/58         Pulse Readings from Last 3 Encounters:   03/10/22 63   07/28/21 65   06/24/21 71          Wt Readings from Last 3 Encounters:   12/08/22 90.7 kg (200 lb)   12/08/22 93.4 kg (206 lb)   03/10/22 93.4 kg (206 lb)       Constitutional: Oriented to person, place, and time. HENT: Head: Normocephalic and atraumatic. Neck: No JVD present. Cardiovascular: Regular rhythm. No  gallop or rubs appreciated.   No obvious murmur appreciated today  Lung: Breath sounds normal. No respiratory distress. No ronchi or rales appreciated  Abdominal: No tenderness. No rebound and no guarding. Musculoskeletal: There is no lower extremity edema. No cynosis    LABS:   @  Lab Results   Component Value Date/Time    WBC 3.8 (L) 06/21/2021 10:59 AM    HGB 12.8 (L) 06/21/2021 10:59 AM    HCT 41.1 06/21/2021 10:59 AM    PLATELET 640 85/22/0264 10:59 AM    MCV 87.8 06/21/2021 10:59 AM     Lab Results   Component Value Date/Time    Sodium 143 06/21/2021 10:59 AM    Potassium 4.0 06/21/2021 10:59 AM    Chloride 110 (H) 06/21/2021 10:59 AM    CO2 28 06/21/2021 10:59 AM    Glucose 86 06/21/2021 10:59 AM    BUN 13 06/21/2021 10:59 AM    Creatinine 0.9 06/21/2021 10:59 AM     Lipids Latest Ref Rng & Units 6/21/2021 3/5/2021   Chol, Total 140 - 199 mg/dl 226(H) 252(H)   HDL 40 - 96 mg/dl 50 53   LDL 0 - 130 mg/dl 159(H) 178(H)   Trig 29 - 150 mg/dl 86 104   Chol/HDL Ratio 0.0 - 4.4 Ratio 4. 5(H) 4. 8(H)     Lab Results   Component Value Date/Time    ALT (SGPT) 52 06/21/2021 10:59 AM     Lab Results   Component Value Date/Time    Hemoglobin A1c 5.4 03/05/2021 12:49 PM     Lab Results   Component Value Date/Time    TSH 0.675 03/05/2021 12:49 PM       EKG  12/2020: Sinus rhythm at 54 bpm.  No heart block or pause. No ST changes of ischemia    ECHO ADULT COMPLETE 12/08/2022 12/8/2022  Interpretation Summary    Left Ventricle: Normal left ventricular systolic function with a visually estimated EF of 55 - 60%. Left ventricle size is normal. Normal wall thickness. Normal wall motion. Normal diastolic function. Right Ventricle: Right ventricle size is normal. Normal systolic function. TAPSE is normal.    Aortic Valve: Mild regurgitation. AV PHT is 548.7 millisecond. No stenosis. AV peak velocity is 2.3 m/s. Mitral Valve: Mild regurgitation. No stenosis noted. Tricuspid Valve: Mild regurgitation. No stenosis noted. Pulmonic Valve: Mild regurgitation with multiple jets.     Pulmonary Arteries: Pulmonary hypertension not present. The estimated PASP is 32 mmHg. STRESS TEST (EST, PHARM, NUC, ECHO etc)    CATHETERIZATION    IMPRESSION & PLAN:  60 y.o. female with depression and hyperlipidemia    Hyperlipidemia: This is being managed by PCP. She was started on atorvastatin in 03/2021. Patient tells me that she is not taking atorvastatin again. I have advised her to restart atorvastatin. Is important that she takes atorvastatin for her lipid-lowering goal    Valvular heart disease:  Echo in 05/2021 showed mild AI, mild TR, mild MR and LA  Echo in 12/2022 with mild AI, mild TR and MR. No fluid overload on exam today    GERD: Patient is complaining of symptoms highly concerning for GERD. I will start patient on Protonix 20 mg daily. Have asked patient to follow-up with PCP in consider GI referral.    Currently patient does not appear to have any symptoms concerning for angina or heart failure. No evidence of fluid overload on exam.    This plan was discussed with patient who is in agreement. Thank you for allowing me to participate in patient care. Please feel free to call me if you have any question or concern. Kristi Lesch, MD  Please note: This document has been produced using voice recognition software. Unrecognized errors in transcription may be present.

## 2022-12-08 NOTE — PATIENT INSTRUCTIONS
New Medication/Medication Changes    Start Protonix 20 mg take 1 tab daily for 3 months     Start Lipitor 20 mg take 1 tab every night at bedtime     **please allow 24-48 hrs for medication to be escribed to pharmacy** If you need any refills on medications please contact your pharmacy so that the request can be escribed to the provider for review.

## 2022-12-08 NOTE — PROGRESS NOTES
Identified pt with two pt identifiers(name and ). Reviewed record in preparation for visit and have obtained necessary documentation. Jannet Brock presents today for No chief complaint on file. Pt denies DIZZINESS, SOB, CHEST PAIN/ PRESSURE, FATIGUE/WEAKNESS, HEADACHES, SWELLING. Jannet Brock preferred language for health care discussion is english/other. Personal Protective Equipment:   Personal Protective Equipment was used including: mask-surgical and hands-gloves. Patient was placed on no precaution(s). Patient was masked. Precautions:   Patient currently on None  Patient currently roomed with door closed. Is someone accompanying this pt? no    Is the patient using any DME equipment during [address]? no    Depression Screening:  3 most recent PHQ Screens 2021   Little interest or pleasure in doing things Not at all   Feeling down, depressed, irritable, or hopeless Several days   Total Score PHQ 2 1   Trouble falling or staying asleep, or sleeping too much More than half the days   Feeling tired or having little energy Several days   Poor appetite, weight loss, or overeating More than half the days   Feeling bad about yourself - or that you are a failure or have let yourself or your family down More than half the days   Trouble concentrating on things such as school, work, reading, or watching TV Not at all   Moving or speaking so slowly that other people could have noticed; or the opposite being so fidgety that others notice Not at all   Thoughts of being better off dead, or hurting yourself in some way Not at all   PHQ 9 Score 8   How difficult have these problems made it for you to do your work, take care of your home and get along with others -       Learning Assessment:  No flowsheet data found. Abuse Screening:  Abuse Screening Questionnaire 2021   Do you ever feel afraid of your partner?  N   Are you in a relationship with someone who physically or mentally threatens you? N   Is it safe for you to go home? Y       Fall Risk  Fall Risk Assessment, last 12 mths 7/28/2021   Able to walk? Yes   Fall in past 12 months? 1   Do you feel unsteady? 0   Are you worried about falling 1   Is TUG test greater than 12 seconds? 0   Is the gait abnormal? 0   Number of falls in past 12 months 1   Fall with injury? 1       Pt currently taking Anticoagulant therapy? no  Pt currently taking Antiplatelet therapy? no    Coordination of Care:  1. Have you been to the ER, urgent care clinic since your last visit? Hospitalized since your last visit? no    2. Have you seen or consulted any other health care providers outside of the [address] [place] [address] since your last visit? Include any pap smears or colon screening. no      Please see Red banners under Allergies and Med Rec to remove outside inquires. All correct information has been verified with patient and added to chart.      Medication's patient's would liked removed has been marked not taking to be removed per Verbal order and read back per Isabel Dimas MD

## 2023-06-08 ENCOUNTER — OFFICE VISIT (OUTPATIENT)
Age: 63
End: 2023-06-08
Payer: COMMERCIAL

## 2023-06-08 VITALS
DIASTOLIC BLOOD PRESSURE: 68 MMHG | BODY MASS INDEX: 32.28 KG/M2 | HEART RATE: 85 BPM | OXYGEN SATURATION: 98 % | WEIGHT: 200 LBS | SYSTOLIC BLOOD PRESSURE: 124 MMHG

## 2023-06-08 DIAGNOSIS — Z13.220 SCREENING FOR CHOLESTEROL LEVEL: ICD-10-CM

## 2023-06-08 DIAGNOSIS — K21.9 GASTROESOPHAGEAL REFLUX DISEASE WITHOUT ESOPHAGITIS: ICD-10-CM

## 2023-06-08 DIAGNOSIS — R01.1 CARDIAC MURMUR, UNSPECIFIED: Primary | ICD-10-CM

## 2023-06-08 DIAGNOSIS — E78.00 PURE HYPERCHOLESTEROLEMIA, UNSPECIFIED: ICD-10-CM

## 2023-06-08 PROCEDURE — 99214 OFFICE O/P EST MOD 30 MIN: CPT | Performed by: INTERNAL MEDICINE

## 2023-06-08 ASSESSMENT — PATIENT HEALTH QUESTIONNAIRE - PHQ9
SUM OF ALL RESPONSES TO PHQ QUESTIONS 1-9: 0
SUM OF ALL RESPONSES TO PHQ9 QUESTIONS 1 & 2: 0
1. LITTLE INTEREST OR PLEASURE IN DOING THINGS: 0
SUM OF ALL RESPONSES TO PHQ QUESTIONS 1-9: 0
2. FEELING DOWN, DEPRESSED OR HOPELESS: 0

## 2023-06-08 NOTE — PATIENT INSTRUCTIONS
Testing   Echo/ Nuclear Stress/ Treadmill Stress/ 24/48 HR Holter    Please call [facility] central scheduling at [phone]/ [facility] central scheduling at [phone] to schedule testing. Nuclear Stress Instructions-Nothing to eat or drink past midnight and no medicaitons the morning of cardiac testing. **call office 3-5 days after testing is completed for results** Please ensure testing is completed prior to scheduled follow up appointment. If it is not completed your appointment may be rescheduled**    New Medication/Medication Changes      **please allow 24-48 hrs for medication to be escribed to pharmacy** If you need any refills on medications please contact your pharmacy so that the request can be escribed to the provider for review seven to 10 days prior to being out of medication. Labs      No appointment required for lab work  [address] [place]  ( [address] [facility],Third Floor to 3:30pm.) - You must call to make an appointment for blood work at this site.    Contact :[address] [facility] [address], [address] at [facility], [address], [address], [address] [facility], [address] [facility], [address], [facility]

## 2023-06-08 NOTE — PROGRESS NOTES
Identified pt with two pt identifiers(name and ). Reviewed record in preparation for visit and have obtained necessary documentation. Anne Gruber presents today for   Chief Complaint   Patient presents with    Follow-up     6 month        Pt c/o occ palpitations           Anne Gruber preferred language for health care discussion is english/other. Personal Protective Equipment:   Personal Protective Equipment was used including: mask-surgical and hands-gloves. Patient was placed on no precaution(s). Patient was masked. Precautions:   Patient currently on None  Patient currently roomed with door closed. Is someone accompanying this pt? No     Is the patient using any DME equipment during OV? No     Depression Screening:  completed    Abuse Screening:  completed      Pt currently taking Anticoagulant therapy? No   Pt currently taking Antiplatelet therapy? No     Coordination of Care:  1. Have you been to the ER, urgent care clinic since your last visit? Hospitalized since your last visit? no    2. Have you seen or consulted any other health care providers outside of the [address] [place] [address] since your last visit? Include any pap smears or colon screening.  No
question or concern. Andre Chawla MD  Please note: This document has been produced using voice recognition software. Unrecognized errors in transcription may be present.

## 2023-07-07 ENCOUNTER — HOSPITAL ENCOUNTER (OUTPATIENT)
Facility: HOSPITAL | Age: 63
Setting detail: SPECIMEN
Discharge: HOME | End: 2023-07-10
Payer: COMMERCIAL

## 2023-07-07 PROCEDURE — 88175 CYTOPATH C/V AUTO FLUID REDO: CPT

## 2023-09-06 SDOH — HEALTH STABILITY: PHYSICAL HEALTH: ON AVERAGE, HOW MANY DAYS PER WEEK DO YOU ENGAGE IN MODERATE TO STRENUOUS EXERCISE (LIKE A BRISK WALK)?: 3 DAYS

## 2023-09-06 SDOH — HEALTH STABILITY: PHYSICAL HEALTH: ON AVERAGE, HOW MANY MINUTES DO YOU ENGAGE IN EXERCISE AT THIS LEVEL?: 30 MIN

## 2023-09-07 ENCOUNTER — OFFICE VISIT (OUTPATIENT)
Facility: CLINIC | Age: 63
End: 2023-09-07
Payer: COMMERCIAL

## 2023-09-07 ENCOUNTER — HOSPITAL ENCOUNTER (OUTPATIENT)
Facility: HOSPITAL | Age: 63
Setting detail: SPECIMEN
Discharge: HOME | End: 2023-09-07
Payer: COMMERCIAL

## 2023-09-07 VITALS
HEART RATE: 74 BPM | RESPIRATION RATE: 18 BRPM | HEIGHT: 66 IN | SYSTOLIC BLOOD PRESSURE: 123 MMHG | BODY MASS INDEX: 32.95 KG/M2 | DIASTOLIC BLOOD PRESSURE: 65 MMHG | OXYGEN SATURATION: 97 % | WEIGHT: 205 LBS

## 2023-09-07 DIAGNOSIS — Z13.228 SCREENING FOR METABOLIC DISORDER: ICD-10-CM

## 2023-09-07 DIAGNOSIS — Z13.29 SCREENING FOR THYROID DISORDER: ICD-10-CM

## 2023-09-07 DIAGNOSIS — F32.A DEPRESSION, UNSPECIFIED DEPRESSION TYPE: ICD-10-CM

## 2023-09-07 DIAGNOSIS — Z23 ENCOUNTER FOR VACCINATION: ICD-10-CM

## 2023-09-07 DIAGNOSIS — Z76.89 ESTABLISHING CARE WITH NEW DOCTOR, ENCOUNTER FOR: Primary | ICD-10-CM

## 2023-09-07 LAB
ALBUMIN SERPL-MCNC: 3.7 G/DL (ref 3.4–5)
ALBUMIN/GLOB SERPL: 1.1 (ref 0.8–1.7)
ALP SERPL-CCNC: 69 U/L (ref 45–117)
ALT SERPL-CCNC: 29 U/L (ref 13–56)
ANION GAP SERPL CALC-SCNC: 8 MMOL/L (ref 3–18)
AST SERPL-CCNC: 17 U/L (ref 10–38)
BILIRUB SERPL-MCNC: 0.3 MG/DL (ref 0.2–1)
BUN SERPL-MCNC: 14 MG/DL (ref 7–18)
BUN/CREAT SERPL: 15 (ref 12–20)
CALCIUM SERPL-MCNC: 9.4 MG/DL (ref 8.5–10.1)
CHLORIDE SERPL-SCNC: 108 MMOL/L (ref 100–111)
CO2 SERPL-SCNC: 27 MMOL/L (ref 21–32)
CREAT SERPL-MCNC: 0.96 MG/DL (ref 0.6–1.3)
GLOBULIN SER CALC-MCNC: 3.4 G/DL (ref 2–4)
GLUCOSE SERPL-MCNC: 99 MG/DL (ref 74–99)
POTASSIUM SERPL-SCNC: 4.5 MMOL/L (ref 3.5–5.5)
PROT SERPL-MCNC: 7.1 G/DL (ref 6.4–8.2)
SODIUM SERPL-SCNC: 143 MMOL/L (ref 136–145)
T4 FREE SERPL-MCNC: 1 NG/DL (ref 0.7–1.5)
TSH SERPL DL<=0.05 MIU/L-ACNC: 1.4 UIU/ML (ref 0.36–3.74)

## 2023-09-07 PROCEDURE — 90750 HZV VACC RECOMBINANT IM: CPT | Performed by: INTERNAL MEDICINE

## 2023-09-07 PROCEDURE — 36415 COLL VENOUS BLD VENIPUNCTURE: CPT

## 2023-09-07 PROCEDURE — 84443 ASSAY THYROID STIM HORMONE: CPT

## 2023-09-07 PROCEDURE — 84439 ASSAY OF FREE THYROXINE: CPT

## 2023-09-07 PROCEDURE — 99204 OFFICE O/P NEW MOD 45 MIN: CPT | Performed by: INTERNAL MEDICINE

## 2023-09-07 PROCEDURE — 90471 IMMUNIZATION ADMIN: CPT | Performed by: INTERNAL MEDICINE

## 2023-09-07 PROCEDURE — 80053 COMPREHEN METABOLIC PANEL: CPT

## 2023-09-07 RX ORDER — ESCITALOPRAM OXALATE 10 MG/1
10 TABLET ORAL DAILY
Qty: 60 TABLET | Refills: 0 | Status: SHIPPED | OUTPATIENT
Start: 2023-09-07

## 2023-09-07 ASSESSMENT — PATIENT HEALTH QUESTIONNAIRE - PHQ9
8. MOVING OR SPEAKING SO SLOWLY THAT OTHER PEOPLE COULD HAVE NOTICED. OR THE OPPOSITE, BEING SO FIGETY OR RESTLESS THAT YOU HAVE BEEN MOVING AROUND A LOT MORE THAN USUAL: 0
SUM OF ALL RESPONSES TO PHQ9 QUESTIONS 1 & 2: 3
6. FEELING BAD ABOUT YOURSELF - OR THAT YOU ARE A FAILURE OR HAVE LET YOURSELF OR YOUR FAMILY DOWN: 1
7. TROUBLE CONCENTRATING ON THINGS, SUCH AS READING THE NEWSPAPER OR WATCHING TELEVISION: 0
1. LITTLE INTEREST OR PLEASURE IN DOING THINGS: 2
4. FEELING TIRED OR HAVING LITTLE ENERGY: 2
SUM OF ALL RESPONSES TO PHQ QUESTIONS 1-9: 9
SUM OF ALL RESPONSES TO PHQ QUESTIONS 1-9: 9
10. IF YOU CHECKED OFF ANY PROBLEMS, HOW DIFFICULT HAVE THESE PROBLEMS MADE IT FOR YOU TO DO YOUR WORK, TAKE CARE OF THINGS AT HOME, OR GET ALONG WITH OTHER PEOPLE: 2
2. FEELING DOWN, DEPRESSED OR HOPELESS: 1
3. TROUBLE FALLING OR STAYING ASLEEP: 2
5. POOR APPETITE OR OVEREATING: 1
SUM OF ALL RESPONSES TO PHQ QUESTIONS 1-9: 9
9. THOUGHTS THAT YOU WOULD BE BETTER OFF DEAD, OR OF HURTING YOURSELF: 0
SUM OF ALL RESPONSES TO PHQ QUESTIONS 1-9: 9

## 2023-09-07 ASSESSMENT — ENCOUNTER SYMPTOMS
ABDOMINAL PAIN: 0
SHORTNESS OF BREATH: 0
CONSTIPATION: 0
COUGH: 0
DIARRHEA: 0

## 2023-09-07 NOTE — PROGRESS NOTES
ROOM # 10  Identified pt with two pt identifiers(name and ). Reviewed record in preparation for visit and have obtained necessary documentation. Chief Complaint   Patient presents with    New Patient      Yoshi Quinonez preferred language for health care discussion is english/other. Is the patient using any DME equipment during OV? no    Yoshi Quinonez is due for:  Health Maintenance Due   Topic    COVID-19 Vaccine (1)    HIV screen     Hepatitis C screen     DTaP/Tdap/Td vaccine (1 - Tdap)    Shingles vaccine (1 of 2)    Lipids     Breast cancer screen     Flu vaccine (1)       Health Maintenance reviewed and discussed per provider  Please order/place referral if appropriate. 1. For patients aged 43-73: Has the patient had a colonoscopy? yes  If the patient is female:    2. For patients aged 43-66: Has the patient had a mammogram within the past 2 years? no    3. For patients aged 21-65: Has the patient had a pap smear? yes    Advance Directive:  1. Do you have an advance directive in place? Patient Reply:no    2. If not, would you like material regarding how to put one in place? Patient Reply:no    Coordination of Care:  1. Have you been to the ER, urgent care clinic since your last visit? Hospitalized since your last visit? Patient Reply:no    2. Have you seen or consulted any other health care providers outside of the [address] [place] [address] since your last visit? Include any pap smears or colon. Patient Reply:no    Patient is accompanied by  I have received verbal consent from Yoshi Quinonez to discuss any/all medical information while they are present in the room.     Depression Screening:  PHQ-9  2023   Little interest or pleasure in doing things 0   Little interest or pleasure in doing things -   Feeling down, depressed, or hopeless 0   Trouble falling or staying asleep, or sleeping too much -   Trouble falling or staying asleep, or sleeping too much -   Feeling tired or having little

## 2023-09-07 NOTE — PATIENT INSTRUCTIONS
Please bring copy of colonoscopy report and pathology from home.   Please bring copy of report of mammogram.

## 2023-10-16 ENCOUNTER — TELEPHONE (OUTPATIENT)
Facility: CLINIC | Age: 63
End: 2023-10-16

## 2023-10-16 NOTE — TELEPHONE ENCOUNTER
Patient called for new refill request be sent to the [facility] pharmacy on file. Prescription was sent however the pharmacy closed.         escitalopram (LEXAPRO) 10 MG tablet [[id number]]     Order Details  Dose: 10 mg Route: Oral Frequency: DAILY   Dispense Quantity: 60 tablet Refills: 0          Sig: Take 1 tablet by mouth daily           Last Appointment:  9/7/2023  Future Appointments   Date Time Provider [address] [place] [address]   11/7/2023  8:00 AM Alfonso Sierra MD [facility] BS AMB   3/14/2024 10:30 AM Randi Hartley MD [facility] BS AMB

## 2023-11-05 SDOH — ECONOMIC STABILITY: TRANSPORTATION INSECURITY
IN THE PAST 12 MONTHS, HAS LACK OF TRANSPORTATION KEPT YOU FROM MEETINGS, WORK, OR FROM GETTING THINGS NEEDED FOR DAILY LIVING?: NO

## 2023-11-05 SDOH — ECONOMIC STABILITY: FOOD INSECURITY: WITHIN THE PAST 12 MONTHS, YOU WORRIED THAT YOUR FOOD WOULD RUN OUT BEFORE YOU GOT MONEY TO BUY MORE.: NEVER TRUE

## 2023-11-05 SDOH — ECONOMIC STABILITY: HOUSING INSECURITY
IN THE LAST 12 MONTHS, WAS THERE A TIME WHEN YOU DID NOT HAVE A STEADY PLACE TO SLEEP OR SLEPT IN A SHELTER (INCLUDING NOW)?: NO

## 2023-11-05 SDOH — ECONOMIC STABILITY: FOOD INSECURITY: WITHIN THE PAST 12 MONTHS, THE FOOD YOU BOUGHT JUST DIDN'T LAST AND YOU DIDN'T HAVE MONEY TO GET MORE.: NEVER TRUE

## 2023-11-05 SDOH — ECONOMIC STABILITY: INCOME INSECURITY: HOW HARD IS IT FOR YOU TO PAY FOR THE VERY BASICS LIKE FOOD, HOUSING, MEDICAL CARE, AND HEATING?: NOT VERY HARD

## 2023-11-07 ENCOUNTER — OFFICE VISIT (OUTPATIENT)
Facility: CLINIC | Age: 63
End: 2023-11-07
Payer: COMMERCIAL

## 2023-11-07 ENCOUNTER — HOSPITAL ENCOUNTER (OUTPATIENT)
Facility: HOSPITAL | Age: 63
Setting detail: SPECIMEN
Discharge: HOME | End: 2023-11-10
Payer: COMMERCIAL

## 2023-11-07 VITALS
WEIGHT: 204.8 LBS | RESPIRATION RATE: 18 BRPM | HEIGHT: 66 IN | BODY MASS INDEX: 32.92 KG/M2 | SYSTOLIC BLOOD PRESSURE: 110 MMHG | DIASTOLIC BLOOD PRESSURE: 62 MMHG | TEMPERATURE: 97.7 F | OXYGEN SATURATION: 96 % | HEART RATE: 63 BPM

## 2023-11-07 DIAGNOSIS — Z23 ENCOUNTER FOR VACCINATION: ICD-10-CM

## 2023-11-07 DIAGNOSIS — E78.5 HYPERLIPIDEMIA, UNSPECIFIED HYPERLIPIDEMIA TYPE: Primary | ICD-10-CM

## 2023-11-07 DIAGNOSIS — E78.5 HYPERLIPIDEMIA, UNSPECIFIED HYPERLIPIDEMIA TYPE: ICD-10-CM

## 2023-11-07 LAB
CHOLEST SERPL-MCNC: 285 MG/DL
HDLC SERPL-MCNC: 58 MG/DL (ref 40–60)
HDLC SERPL: 4.9 (ref 0–5)
LDLC SERPL CALC-MCNC: 190.6 MG/DL (ref 0–100)
LIPID PANEL: ABNORMAL
TRIGL SERPL-MCNC: 182 MG/DL
VLDLC SERPL CALC-MCNC: 36.4 MG/DL

## 2023-11-07 PROCEDURE — 99213 OFFICE O/P EST LOW 20 MIN: CPT | Performed by: INTERNAL MEDICINE

## 2023-11-07 PROCEDURE — 36415 COLL VENOUS BLD VENIPUNCTURE: CPT

## 2023-11-07 PROCEDURE — 90471 IMMUNIZATION ADMIN: CPT | Performed by: INTERNAL MEDICINE

## 2023-11-07 PROCEDURE — 80061 LIPID PANEL: CPT

## 2023-11-07 PROCEDURE — 90750 HZV VACC RECOMBINANT IM: CPT | Performed by: INTERNAL MEDICINE

## 2023-11-07 RX ORDER — ATORVASTATIN CALCIUM 20 MG/1
20 TABLET, FILM COATED ORAL DAILY
Qty: 90 TABLET | Refills: 1 | Status: SHIPPED | OUTPATIENT
Start: 2023-11-07

## 2023-11-07 SDOH — ECONOMIC STABILITY: FOOD INSECURITY: WITHIN THE PAST 12 MONTHS, YOU WORRIED THAT YOUR FOOD WOULD RUN OUT BEFORE YOU GOT MONEY TO BUY MORE.: NEVER TRUE

## 2023-11-07 SDOH — ECONOMIC STABILITY: INCOME INSECURITY: HOW HARD IS IT FOR YOU TO PAY FOR THE VERY BASICS LIKE FOOD, HOUSING, MEDICAL CARE, AND HEATING?: NOT HARD AT ALL

## 2023-11-07 SDOH — ECONOMIC STABILITY: FOOD INSECURITY: WITHIN THE PAST 12 MONTHS, THE FOOD YOU BOUGHT JUST DIDN'T LAST AND YOU DIDN'T HAVE MONEY TO GET MORE.: NEVER TRUE

## 2023-11-07 ASSESSMENT — COLUMBIA-SUICIDE SEVERITY RATING SCALE - C-SSRS
7. DID THIS OCCUR IN THE LAST THREE MONTHS: NO
5. HAVE YOU STARTED TO WORK OUT OR WORKED OUT THE DETAILS OF HOW TO KILL YOURSELF? DO YOU INTEND TO CARRY OUT THIS PLAN?: NO
3. HAVE YOU BEEN THINKING ABOUT HOW YOU MIGHT KILL YOURSELF?: NO
4. HAVE YOU HAD THESE THOUGHTS AND HAD SOME INTENTION OF ACTING ON THEM?: NO

## 2023-11-07 ASSESSMENT — PATIENT HEALTH QUESTIONNAIRE - PHQ9
4. FEELING TIRED OR HAVING LITTLE ENERGY: 0
SUM OF ALL RESPONSES TO PHQ9 QUESTIONS 1 & 2: 1
SUM OF ALL RESPONSES TO PHQ QUESTIONS 1-9: 3
8. MOVING OR SPEAKING SO SLOWLY THAT OTHER PEOPLE COULD HAVE NOTICED. OR THE OPPOSITE, BEING SO FIGETY OR RESTLESS THAT YOU HAVE BEEN MOVING AROUND A LOT MORE THAN USUAL: 0
SUM OF ALL RESPONSES TO PHQ QUESTIONS 1-9: 3
10. IF YOU CHECKED OFF ANY PROBLEMS, HOW DIFFICULT HAVE THESE PROBLEMS MADE IT FOR YOU TO DO YOUR WORK, TAKE CARE OF THINGS AT HOME, OR GET ALONG WITH OTHER PEOPLE: 0
3. TROUBLE FALLING OR STAYING ASLEEP: 2
7. TROUBLE CONCENTRATING ON THINGS, SUCH AS READING THE NEWSPAPER OR WATCHING TELEVISION: 0
9. THOUGHTS THAT YOU WOULD BE BETTER OFF DEAD, OR OF HURTING YOURSELF: 0
6. FEELING BAD ABOUT YOURSELF - OR THAT YOU ARE A FAILURE OR HAVE LET YOURSELF OR YOUR FAMILY DOWN: 0
1. LITTLE INTEREST OR PLEASURE IN DOING THINGS: 0
5. POOR APPETITE OR OVEREATING: 0
2. FEELING DOWN, DEPRESSED OR HOPELESS: 1

## 2023-11-07 ASSESSMENT — ENCOUNTER SYMPTOMS
SHORTNESS OF BREATH: 0
ABDOMINAL PAIN: 0

## 2023-12-05 ENCOUNTER — OFFICE VISIT (OUTPATIENT)
Facility: CLINIC | Age: 63
End: 2023-12-05
Payer: COMMERCIAL

## 2023-12-05 ENCOUNTER — HOSPITAL ENCOUNTER (OUTPATIENT)
Facility: HOSPITAL | Age: 63
Setting detail: SPECIMEN
Discharge: HOME | End: 2023-12-08
Payer: COMMERCIAL

## 2023-12-05 VITALS
HEART RATE: 68 BPM | TEMPERATURE: 97 F | WEIGHT: 202.2 LBS | BODY MASS INDEX: 32.5 KG/M2 | DIASTOLIC BLOOD PRESSURE: 62 MMHG | HEIGHT: 66 IN | OXYGEN SATURATION: 95 % | SYSTOLIC BLOOD PRESSURE: 118 MMHG | RESPIRATION RATE: 18 BRPM

## 2023-12-05 DIAGNOSIS — E78.5 HYPERLIPIDEMIA, UNSPECIFIED HYPERLIPIDEMIA TYPE: ICD-10-CM

## 2023-12-05 DIAGNOSIS — Z72.821 POOR SLEEP HYGIENE: ICD-10-CM

## 2023-12-05 DIAGNOSIS — F51.04 PSYCHOPHYSIOLOGICAL INSOMNIA: Primary | ICD-10-CM

## 2023-12-05 DIAGNOSIS — Z79.899 ON STATIN THERAPY: ICD-10-CM

## 2023-12-05 DIAGNOSIS — F32.A DEPRESSION, UNSPECIFIED DEPRESSION TYPE: ICD-10-CM

## 2023-12-05 LAB
ALBUMIN SERPL-MCNC: 4.2 G/DL (ref 3.4–5)
ALBUMIN/GLOB SERPL: 1.2 (ref 0.8–1.7)
ALP SERPL-CCNC: 70 U/L (ref 45–117)
ALT SERPL-CCNC: 30 U/L (ref 13–56)
AST SERPL-CCNC: 20 U/L (ref 10–38)
BILIRUB DIRECT SERPL-MCNC: 0.2 MG/DL (ref 0–0.2)
BILIRUB SERPL-MCNC: 0.4 MG/DL (ref 0.2–1)
CHOLEST SERPL-MCNC: 169 MG/DL
GLOBULIN SER CALC-MCNC: 3.4 G/DL (ref 2–4)
HDLC SERPL-MCNC: 54 MG/DL (ref 40–60)
HDLC SERPL: 3.1 (ref 0–5)
LDLC SERPL CALC-MCNC: 94.6 MG/DL (ref 0–100)
LIPID PANEL: NORMAL
PROT SERPL-MCNC: 7.6 G/DL (ref 6.4–8.2)
TRIGL SERPL-MCNC: 102 MG/DL
VLDLC SERPL CALC-MCNC: 20.4 MG/DL

## 2023-12-05 PROCEDURE — 99214 OFFICE O/P EST MOD 30 MIN: CPT | Performed by: INTERNAL MEDICINE

## 2023-12-05 PROCEDURE — 36415 COLL VENOUS BLD VENIPUNCTURE: CPT

## 2023-12-05 PROCEDURE — 80076 HEPATIC FUNCTION PANEL: CPT

## 2023-12-05 PROCEDURE — 80061 LIPID PANEL: CPT

## 2023-12-05 RX ORDER — TRAZODONE HYDROCHLORIDE 50 MG/1
50 TABLET ORAL PRN
Qty: 90 TABLET | Refills: 1 | Status: SHIPPED | OUTPATIENT
Start: 2023-12-05

## 2023-12-05 RX ORDER — B-COMPLEX WITH VITAMIN C
1 TABLET ORAL DAILY
COMMUNITY

## 2023-12-05 ASSESSMENT — COLUMBIA-SUICIDE SEVERITY RATING SCALE - C-SSRS
5. HAVE YOU STARTED TO WORK OUT OR WORKED OUT THE DETAILS OF HOW TO KILL YOURSELF? DO YOU INTEND TO CARRY OUT THIS PLAN?: NO
7. DID THIS OCCUR IN THE LAST THREE MONTHS: NO
4. HAVE YOU HAD THESE THOUGHTS AND HAD SOME INTENTION OF ACTING ON THEM?: NO
3. HAVE YOU BEEN THINKING ABOUT HOW YOU MIGHT KILL YOURSELF?: NO

## 2023-12-05 ASSESSMENT — PATIENT HEALTH QUESTIONNAIRE - PHQ9
1. LITTLE INTEREST OR PLEASURE IN DOING THINGS: 1
7. TROUBLE CONCENTRATING ON THINGS, SUCH AS READING THE NEWSPAPER OR WATCHING TELEVISION: 1
5. POOR APPETITE OR OVEREATING: 0
10. IF YOU CHECKED OFF ANY PROBLEMS, HOW DIFFICULT HAVE THESE PROBLEMS MADE IT FOR YOU TO DO YOUR WORK, TAKE CARE OF THINGS AT HOME, OR GET ALONG WITH OTHER PEOPLE: 1
SUM OF ALL RESPONSES TO PHQ QUESTIONS 1-9: 2
8. MOVING OR SPEAKING SO SLOWLY THAT OTHER PEOPLE COULD HAVE NOTICED. OR THE OPPOSITE, BEING SO FIGETY OR RESTLESS THAT YOU HAVE BEEN MOVING AROUND A LOT MORE THAN USUAL: 0
SUM OF ALL RESPONSES TO PHQ QUESTIONS 1-9: 8
2. FEELING DOWN, DEPRESSED OR HOPELESS: 1
SUM OF ALL RESPONSES TO PHQ QUESTIONS 1-9: 2
1. LITTLE INTEREST OR PLEASURE IN DOING THINGS: 1
SUM OF ALL RESPONSES TO PHQ QUESTIONS 1-9: 8
4. FEELING TIRED OR HAVING LITTLE ENERGY: 3
3. TROUBLE FALLING OR STAYING ASLEEP: 1
SUM OF ALL RESPONSES TO PHQ QUESTIONS 1-9: 2
SUM OF ALL RESPONSES TO PHQ9 QUESTIONS 1 & 2: 2
9. THOUGHTS THAT YOU WOULD BE BETTER OFF DEAD, OR OF HURTING YOURSELF: 0
SUM OF ALL RESPONSES TO PHQ9 QUESTIONS 1 & 2: 2
2. FEELING DOWN, DEPRESSED OR HOPELESS: 1
SUM OF ALL RESPONSES TO PHQ QUESTIONS 1-9: 8
SUM OF ALL RESPONSES TO PHQ QUESTIONS 1-9: 2
SUM OF ALL RESPONSES TO PHQ QUESTIONS 1-9: 8
6. FEELING BAD ABOUT YOURSELF - OR THAT YOU ARE A FAILURE OR HAVE LET YOURSELF OR YOUR FAMILY DOWN: 1

## 2023-12-05 ASSESSMENT — ENCOUNTER SYMPTOMS
SHORTNESS OF BREATH: 0
ABDOMINAL PAIN: 0

## 2023-12-05 NOTE — PROGRESS NOTES
1. \"Have you been to the ER, urgent care clinic since your last visit? Hospitalized since your last visit? \" No    2. \"Have you seen or consulted any other health care providers outside of the [address] [place] [address] since your last visit? \" No     3. For patients aged 43-73: Has the patient had a colonoscopy / FIT/ Cologuard? Yes - no Care Gap present Patient had colonoscopy on 05/21/2021. If the patient is female:    4. For patients aged 43-66: Has the patient had a mammogram within the past 2 years? Yes - Care Gap present. Rooming MA/LPN to request most recent results Patient stated she had it done sometime this year but is unsure of the date. 5. For patients aged 21-65: Has the patient had a pap smear?  No

## 2023-12-05 NOTE — PROGRESS NOTES
Subjective:      Patient ID: Lebron Hicks is a 58 y.o. female. Follow up    Pt's depression symptoms seems to be controlled with out medications. She does complaints of insomnia and tiredness for the last 3-4 months. Seems to be unrelated to depression, she has a poor sleep hygiene. Wakes up at 4 am to use the bathroom and cannot go back to sleep. She goes to sleep by mid night, using her phone or watching tv until late from bed. She has a cruise trip with her friend coming in a couple of months, she is happy about it. On problems reported on statins. Checking LFTs on statins. She brought colonoscopy report. Colonoscopy report sent to scan today. She signed consent to request mammogram.  Alvan Severance done on September 5th, 2023. Requesting records today. PHQ-9 Total Score: 8 (12/5/2023  9:10 AM)  Thoughts that you would be better off dead, or of hurting yourself in some way: 0 (12/5/2023  9:10 AM)  Re-started Trazodone 50 mg qhs, increase as tolerated to 100 mg qhs. Sleep hygiene info provided. 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 GERD  pantoprazole (PROTONIX) 20 MG tablet  Referred to GI for further evaluation, by cardiology     Depression  PHQ-9 Total Score: 9 (9/7/2023  8:22 AM)  Thoughts that you would be better off dead, or of hurting yourself in some way: 0 (9/7/2023  8:22 AM)  PHQ-9 Total Score: 3 (11/7/2023  8:23 AM)  Thoughts that you would be better off dead, or of hurting yourself in some way: 0 (11/7/2023  8:23 AM)  PHQ-9 Total Score: 8 (12/5/2023  9:10 AM)  Thoughts that you would be better off dead, or of hurting yourself in some way: 0 (12/5/2023  9:10 AM)  STOPPED since 10/20/23 (pt's decision), escitalopram (LEXAPRO) 10 MG tablet, daily.      Insomnia  traZODone (DESYREL) 50 MG tablet, prn     Constipation  bisacodyl

## 2024-02-05 ENCOUNTER — OFFICE VISIT (OUTPATIENT)
Facility: CLINIC | Age: 64
End: 2024-02-05
Payer: COMMERCIAL

## 2024-02-05 VITALS
DIASTOLIC BLOOD PRESSURE: 69 MMHG | OXYGEN SATURATION: 98 % | SYSTOLIC BLOOD PRESSURE: 130 MMHG | TEMPERATURE: 96.8 F | WEIGHT: 205.6 LBS | HEART RATE: 74 BPM | RESPIRATION RATE: 12 BRPM | HEIGHT: 66 IN | BODY MASS INDEX: 33.04 KG/M2

## 2024-02-05 DIAGNOSIS — Z12.31 SCREENING MAMMOGRAM FOR BREAST CANCER: ICD-10-CM

## 2024-02-05 DIAGNOSIS — M17.0 OSTEOARTHRITIS OF BOTH KNEES, UNSPECIFIED OSTEOARTHRITIS TYPE: ICD-10-CM

## 2024-02-05 DIAGNOSIS — F32.A DEPRESSION, UNSPECIFIED DEPRESSION TYPE: Primary | ICD-10-CM

## 2024-02-05 PROCEDURE — 99213 OFFICE O/P EST LOW 20 MIN: CPT | Performed by: INTERNAL MEDICINE

## 2024-02-05 RX ORDER — ESCITALOPRAM OXALATE 10 MG/1
TABLET ORAL
Qty: 90 TABLET | Refills: 1 | Status: SHIPPED | OUTPATIENT
Start: 2024-02-05

## 2024-02-05 ASSESSMENT — PATIENT HEALTH QUESTIONNAIRE - PHQ9
3. TROUBLE FALLING OR STAYING ASLEEP: 2
4. FEELING TIRED OR HAVING LITTLE ENERGY: 2
SUM OF ALL RESPONSES TO PHQ QUESTIONS 1-9: 9
1. LITTLE INTEREST OR PLEASURE IN DOING THINGS: 1
6. FEELING BAD ABOUT YOURSELF - OR THAT YOU ARE A FAILURE OR HAVE LET YOURSELF OR YOUR FAMILY DOWN: 1
8. MOVING OR SPEAKING SO SLOWLY THAT OTHER PEOPLE COULD HAVE NOTICED. OR THE OPPOSITE, BEING SO FIGETY OR RESTLESS THAT YOU HAVE BEEN MOVING AROUND A LOT MORE THAN USUAL: 0
SUM OF ALL RESPONSES TO PHQ QUESTIONS 1-9: 9
2. FEELING DOWN, DEPRESSED OR HOPELESS: 2
5. POOR APPETITE OR OVEREATING: 1
SUM OF ALL RESPONSES TO PHQ QUESTIONS 1-9: 9
7. TROUBLE CONCENTRATING ON THINGS, SUCH AS READING THE NEWSPAPER OR WATCHING TELEVISION: 0
10. IF YOU CHECKED OFF ANY PROBLEMS, HOW DIFFICULT HAVE THESE PROBLEMS MADE IT FOR YOU TO DO YOUR WORK, TAKE CARE OF THINGS AT HOME, OR GET ALONG WITH OTHER PEOPLE: 1
SUM OF ALL RESPONSES TO PHQ9 QUESTIONS 1 & 2: 3
9. THOUGHTS THAT YOU WOULD BE BETTER OFF DEAD, OR OF HURTING YOURSELF: 0

## 2024-02-05 ASSESSMENT — ENCOUNTER SYMPTOMS
SHORTNESS OF BREATH: 0
ABDOMINAL PAIN: 0

## 2024-02-05 ASSESSMENT — COLUMBIA-SUICIDE SEVERITY RATING SCALE - C-SSRS
1. WITHIN THE PAST MONTH, HAVE YOU WISHED YOU WERE DEAD OR WISHED YOU COULD GO TO SLEEP AND NOT WAKE UP?: NO
2. HAVE YOU ACTUALLY HAD ANY THOUGHTS OF KILLING YOURSELF?: NO
6. HAVE YOU EVER DONE ANYTHING, STARTED TO DO ANYTHING, OR PREPARED TO DO ANYTHING TO END YOUR LIFE?: NO

## 2024-02-05 NOTE — PATIENT INSTRUCTIONS
If having shortness of breath, fever, chills, sweats,, chest pain, swelling, rashes,, stop medication and go to ED for evaluation.  If having any symptoms from Serotonin syndrome, stop medication and go to ED for evaluation.  Serotonin syndrome:  ?Hyperthermia, elevated temperature like a fever.  ?Agitation  ?Slow, continuous, horizontal eye movements.  ?Dilated pupils, big pupils.  ?Tremor  ?Restlessness or can not stay sit position.  ?Inducible or spontaneous muscle twitches.  ?Muscle rigidity  ?Dry oral mucosa  ?Flushed skin and profuse sweating.   ?Increased bowel sounds

## 2024-02-05 NOTE — PROGRESS NOTES
\"Have you been to the ER, urgent care clinic since your last visit?  Hospitalized since your last visit?\"    NO    “Have you seen or consulted any other health care providers outside of [facility] since your last visit?”    Avin[facility],optometrist       Have you had a mammogram?”   YES - Patient stated she had mammogram 09/05/2023, at [facility]. She stated she gave mammogram documents to  staff the last time she was here.        
  Psychiatric:         Attention and Perception: Attention normal. She is attentive.         Mood and Affect: Mood is depressed. Mood is not anxious.         Behavior: Behavior is slowed. Behavior is not hyperactive. Behavior is cooperative.         Thought Content: Thought content does not include homicidal or suicidal ideation. Thought content does not include homicidal or suicidal plan.         Assessment:       ICD-10-CM    1. Depression, unspecified depression type  F32.A escitalopram (LEXAPRO) 10 MG tablet      2. Osteoarthritis of both knees, unspecified osteoarthritis type  M17.0 diclofenac sodium (VOLTAREN) 1 % GEL      3. Screening mammogram for breast cancer  Z12.31 [facility] RUSSELL DIGITAL SCREEN BILATERAL              Plan:   Return in about 12 weeks (around 4/29/2024) for Treatment monitoring depression.      Bronson Song MD

## 2024-03-14 ENCOUNTER — OFFICE VISIT (OUTPATIENT)
Age: 64
End: 2024-03-14

## 2024-03-14 VITALS
BODY MASS INDEX: 32.6 KG/M2 | HEART RATE: 59 BPM | OXYGEN SATURATION: 98 % | SYSTOLIC BLOOD PRESSURE: 118 MMHG | DIASTOLIC BLOOD PRESSURE: 17 MMHG | WEIGHT: 202 LBS

## 2024-03-14 DIAGNOSIS — E78.00 PURE HYPERCHOLESTEROLEMIA, UNSPECIFIED: Primary | ICD-10-CM

## 2024-03-14 NOTE — PROGRESS NOTES
[facility]    Dionne Bridges is 63 y.o. female with a history of depression, hyperlipidemia      Patient is here today for follow-up   Denies any resting or exertional chest pain or chest pressure to suggest angina or any dyspnea to suggest heart failure.   No presyncope or syncope  Denies any PND or LE edema.   Taking all medications regularly.     Past Medical History:   Diagnosis Date    Chronic back pain     Depression     GERD (gastroesophageal reflux disease) 2000    HLD (hyperlipidemia)     Obesity 2019    Osteoarthritis 2021    Sleep apnea     does not use cpap    Tobacco abuse, in remission        Review of Systems:  Cardiac symptoms as noted above in HPI. All others negative.    Current Outpatient Medications   Medication Sig    atorvastatin (LIPITOR) 20 MG tablet Take 1 tablet by mouth daily    escitalopram (LEXAPRO) 10 MG tablet Take half a tablet for 7 to 10 day, and increase to 1 tablet daily as tolerated.    diclofenac sodium (VOLTAREN) 1 % GEL Apply thin layer over knees up to 4 times a day as needed for pain.    B Complex Vitamins (VITAMIN B COMPLEX) TABS Take 1 tablet by mouth daily    traZODone (DESYREL) 50 MG tablet Take 1 tablet by mouth as needed for Sleep    coenzyme Q10 200 MG CAPS capsule Take by mouth daily    pantoprazole (PROTONIX) 20 MG tablet Take 1 tablet by mouth as needed     No current facility-administered medications for this visit.       Past Surgical History:   Procedure Laterality Date    COLONOSCOPY N/A 5/21/2021    SCREENING COLONOSCOPY with hot snared polypectomy performed by Loly Jenkins MD at [facility] ENDOSCOPY    COLONOSCOPY      EYE SURGERY  scleral buckle, cataracts, macular hole repair    HEENT Left     retina re-attached & macular hole repair    HYSTERECTOMY (CERVIX STATUS UNKNOWN)      HYSTERECTOMY, TOTAL ABDOMINAL (CERVIX REMOVED)      MYOMECTOMY      TONSILLECTOMY         Allergies and 
Screening:      3/14/2024    11:00 AM 6/8/2023    11:00 AM   AMB Abuse Screening   Do you ever feel afraid of your partner? N N   Are you in a relationship with someone who physically or mentally threatens you? N N   Is it safe for you to go home? Y Y          Fall Risk      3/14/2024    11:15 AM   Fall Risk   2 or more falls in past year? no   Fall with injury in past year? no         Pt currently taking Anticoagulant therapy? no  Pt currently taking Antiplatelet therapy? no    Coordination of Care:  1. Have you been to the ER, urgent care clinic since your last visit? Hospitalized since your last visit? no    2. Have you seen or consulted any other health care providers outside of the [facility] since your last visit? Include any pap smears or colon screening. no      Please see Red banners under Allergies and Med Rec to remove outside inquires. All correct information has been verified with patient and added to chart.     Medication's patient's would liked removed has been marked not taking to be removed per Verbal order and read back per [place] Angeles MD

## 2024-04-29 ENCOUNTER — OFFICE VISIT (OUTPATIENT)
Facility: CLINIC | Age: 64
End: 2024-04-29
Payer: COMMERCIAL

## 2024-04-29 VITALS
SYSTOLIC BLOOD PRESSURE: 109 MMHG | HEART RATE: 68 BPM | WEIGHT: 203 LBS | BODY MASS INDEX: 32.62 KG/M2 | TEMPERATURE: 96.9 F | HEIGHT: 66 IN | RESPIRATION RATE: 12 BRPM | DIASTOLIC BLOOD PRESSURE: 61 MMHG | OXYGEN SATURATION: 97 %

## 2024-04-29 DIAGNOSIS — F32.A DEPRESSION, UNSPECIFIED DEPRESSION TYPE: ICD-10-CM

## 2024-04-29 DIAGNOSIS — G47.00 INSOMNIA, UNSPECIFIED TYPE: Primary | ICD-10-CM

## 2024-04-29 PROCEDURE — 99213 OFFICE O/P EST LOW 20 MIN: CPT | Performed by: INTERNAL MEDICINE

## 2024-04-29 RX ORDER — HYDROXYZINE PAMOATE 25 MG/1
25 CAPSULE ORAL NIGHTLY PRN
Qty: 30 CAPSULE | Refills: 1 | Status: SHIPPED | OUTPATIENT
Start: 2024-04-29 | End: 2024-06-28

## 2024-04-29 RX ORDER — LATANOPROST 50 UG/ML
1 SOLUTION/ DROPS OPHTHALMIC NIGHTLY
COMMUNITY
Start: 2024-03-18

## 2024-04-29 RX ORDER — ESCITALOPRAM OXALATE 20 MG/1
TABLET ORAL
Qty: 90 TABLET | Refills: 0 | Status: SHIPPED | OUTPATIENT
Start: 2024-04-29

## 2024-04-29 ASSESSMENT — PATIENT HEALTH QUESTIONNAIRE - PHQ9
SUM OF ALL RESPONSES TO PHQ QUESTIONS 1-9: 12
SUM OF ALL RESPONSES TO PHQ9 QUESTIONS 1 & 2: 6
SUM OF ALL RESPONSES TO PHQ QUESTIONS 1-9: 12
5. POOR APPETITE OR OVEREATING: SEVERAL DAYS
SUM OF ALL RESPONSES TO PHQ QUESTIONS 1-9: 12
9. THOUGHTS THAT YOU WOULD BE BETTER OFF DEAD, OR OF HURTING YOURSELF: NOT AT ALL
6. FEELING BAD ABOUT YOURSELF - OR THAT YOU ARE A FAILURE OR HAVE LET YOURSELF OR YOUR FAMILY DOWN: NOT AT ALL
8. MOVING OR SPEAKING SO SLOWLY THAT OTHER PEOPLE COULD HAVE NOTICED. OR THE OPPOSITE, BEING SO FIGETY OR RESTLESS THAT YOU HAVE BEEN MOVING AROUND A LOT MORE THAN USUAL: NOT AT ALL
1. LITTLE INTEREST OR PLEASURE IN DOING THINGS: NEARLY EVERY DAY
10. IF YOU CHECKED OFF ANY PROBLEMS, HOW DIFFICULT HAVE THESE PROBLEMS MADE IT FOR YOU TO DO YOUR WORK, TAKE CARE OF THINGS AT HOME, OR GET ALONG WITH OTHER PEOPLE: SOMEWHAT DIFFICULT
4. FEELING TIRED OR HAVING LITTLE ENERGY: NEARLY EVERY DAY
2. FEELING DOWN, DEPRESSED OR HOPELESS: NEARLY EVERY DAY
SUM OF ALL RESPONSES TO PHQ QUESTIONS 1-9: 12
3. TROUBLE FALLING OR STAYING ASLEEP: SEVERAL DAYS
7. TROUBLE CONCENTRATING ON THINGS, SUCH AS READING THE NEWSPAPER OR WATCHING TELEVISION: SEVERAL DAYS

## 2024-04-29 ASSESSMENT — ENCOUNTER SYMPTOMS
ABDOMINAL PAIN: 0
SHORTNESS OF BREATH: 0

## 2024-04-29 NOTE — PROGRESS NOTES
\"Have you been to the ER, urgent care clinic since your last visit?  Hospitalized since your last visit?\"    NO    “Have you seen or consulted any other health care providers outside of [facility] since your last visit?”    Yes, gilles soria [facility], 04/11/2024            Click Here for Release of Records Request

## 2024-04-29 NOTE — PROGRESS NOTES
Subjective:      Patient ID: Dionne Bridges is a 63 y.o. female.    Follow up    Patient is coming today for regular follow-up on her depression treatment and monitoring.  She is struggling with the stress at home.  Her mother was admitted to hospital for some heart related issue.  She was the primary care of her blood.  Her mother is 86 years old and her dad is 92 years old. Patient had to quit her job to have more time to take care of her father with dementia. Her mother is in the hospital.  Patient is struggling with sleeping as well difficult for her to fall asleep.     Lexapro 10 mg increased to 20 mg daily.  PHQ-9 Total Score: 12 (4/29/2024  1:07 PM)  Thoughts that you would be better off dead, or of hurting yourself in some way: 0 (4/29/2024  1:07 PM)  Can try hydroxyzine low-dose as needed insomnia at nighttime, around 8 PM.  Patient was instructed not to drive or do activities that can put her or others at risk while taking this medication since it can cause drowsiness and sleepiness in order to avoid accidents.  Patient understood and agreed with the plan.          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     GERD  pantoprazole (PROTONIX) 20 MG tablet  Referred to GI for further evaluation, by cardiology     Depression  No manic episodes reported.  PHQ-9 Total Score: 9 (9/7/2023  8:22 AM)  Thoughts that you would be better off dead, or of hurting yourself in some way: 0 (9/7/2023  8:22 AM)  PHQ-9 Total Score: 3 (11/7/2023  8:23 AM)  Thoughts that you would be better off dead, or of hurting yourself in some way: 0 (11/7/2023  8:23 AM)  PHQ-9 Total Score: 8 (12/5/2023  9:10 AM)  Thoughts that you would be better off dead, or of hurting yourself in some way: 0 (12/5/2023  9:10 AM)  PHQ-9 Total Score: 9 (2/5/2024 11:39 AM)  Thoughts that you would be

## 2024-09-17 ENCOUNTER — TELEPHONE (OUTPATIENT)
Facility: CLINIC | Age: 64
End: 2024-09-17

## 2024-10-02 ENCOUNTER — OFFICE VISIT (OUTPATIENT)
Facility: CLINIC | Age: 64
End: 2024-10-02
Payer: MEDICARE

## 2024-10-02 VITALS
OXYGEN SATURATION: 95 % | SYSTOLIC BLOOD PRESSURE: 127 MMHG | HEART RATE: 61 BPM | RESPIRATION RATE: 16 BRPM | BODY MASS INDEX: 33.01 KG/M2 | HEIGHT: 66 IN | WEIGHT: 205.4 LBS | TEMPERATURE: 97 F | DIASTOLIC BLOOD PRESSURE: 75 MMHG

## 2024-10-02 DIAGNOSIS — Z00.00 WELCOME TO MEDICARE PREVENTIVE VISIT: Primary | ICD-10-CM

## 2024-10-02 DIAGNOSIS — F33.0 MAJOR DEPRESSIVE DISORDER, RECURRENT, MILD (HCC): ICD-10-CM

## 2024-10-02 DIAGNOSIS — M54.50 CHRONIC RIGHT-SIDED LOW BACK PAIN WITHOUT SCIATICA: ICD-10-CM

## 2024-10-02 DIAGNOSIS — E78.5 HYPERLIPIDEMIA, UNSPECIFIED HYPERLIPIDEMIA TYPE: ICD-10-CM

## 2024-10-02 DIAGNOSIS — Z71.89 ACP (ADVANCE CARE PLANNING): ICD-10-CM

## 2024-10-02 DIAGNOSIS — G89.29 CHRONIC PAIN OF RIGHT KNEE: ICD-10-CM

## 2024-10-02 DIAGNOSIS — G89.29 CHRONIC RIGHT-SIDED LOW BACK PAIN WITHOUT SCIATICA: ICD-10-CM

## 2024-10-02 DIAGNOSIS — F33.1 MAJOR DEPRESSIVE DISORDER, RECURRENT, MODERATE (HCC): ICD-10-CM

## 2024-10-02 DIAGNOSIS — M25.561 CHRONIC PAIN OF RIGHT KNEE: ICD-10-CM

## 2024-10-02 PROBLEM — F33.9 MAJOR DEPRESSIVE DISORDER, RECURRENT, UNSPECIFIED (HCC): Status: ACTIVE | Noted: 2024-10-02

## 2024-10-02 PROCEDURE — 1036F TOBACCO NON-USER: CPT | Performed by: INTERNAL MEDICINE

## 2024-10-02 PROCEDURE — 99214 OFFICE O/P EST MOD 30 MIN: CPT | Performed by: INTERNAL MEDICINE

## 2024-10-02 PROCEDURE — G8399 PT W/DXA RESULTS DOCUMENT: HCPCS | Performed by: INTERNAL MEDICINE

## 2024-10-02 PROCEDURE — 3017F COLORECTAL CA SCREEN DOC REV: CPT | Performed by: INTERNAL MEDICINE

## 2024-10-02 PROCEDURE — G8417 CALC BMI ABV UP PARAM F/U: HCPCS | Performed by: INTERNAL MEDICINE

## 2024-10-02 PROCEDURE — G0402 INITIAL PREVENTIVE EXAM: HCPCS | Performed by: INTERNAL MEDICINE

## 2024-10-02 PROCEDURE — G8427 DOCREV CUR MEDS BY ELIG CLIN: HCPCS | Performed by: INTERNAL MEDICINE

## 2024-10-02 PROCEDURE — 1123F ACP DISCUSS/DSCN MKR DOCD: CPT | Performed by: INTERNAL MEDICINE

## 2024-10-02 PROCEDURE — G8484 FLU IMMUNIZE NO ADMIN: HCPCS | Performed by: INTERNAL MEDICINE

## 2024-10-02 PROCEDURE — 1090F PRES/ABSN URINE INCON ASSESS: CPT | Performed by: INTERNAL MEDICINE

## 2024-10-02 PROCEDURE — 99497 ADVNCD CARE PLAN 30 MIN: CPT | Performed by: INTERNAL MEDICINE

## 2024-10-02 RX ORDER — LIDOCAINE 50 MG/G
1 PATCH TOPICAL DAILY
Qty: 30 PATCH | Refills: 1 | Status: SHIPPED | OUTPATIENT
Start: 2024-10-02 | End: 2024-12-01

## 2024-10-02 RX ORDER — ATORVASTATIN CALCIUM 20 MG/1
20 TABLET, FILM COATED ORAL DAILY
Qty: 90 TABLET | Refills: 1 | Status: SHIPPED | OUTPATIENT
Start: 2024-10-02

## 2024-10-02 RX ORDER — ESCITALOPRAM OXALATE 10 MG/1
10 TABLET ORAL DAILY
Qty: 90 TABLET | Refills: 0 | Status: SHIPPED | OUTPATIENT
Start: 2024-10-02

## 2024-10-02 ASSESSMENT — PATIENT HEALTH QUESTIONNAIRE - PHQ9
8. MOVING OR SPEAKING SO SLOWLY THAT OTHER PEOPLE COULD HAVE NOTICED. OR THE OPPOSITE, BEING SO FIGETY OR RESTLESS THAT YOU HAVE BEEN MOVING AROUND A LOT MORE THAN USUAL: NOT AT ALL
6. FEELING BAD ABOUT YOURSELF - OR THAT YOU ARE A FAILURE OR HAVE LET YOURSELF OR YOUR FAMILY DOWN: NOT AT ALL
SUM OF ALL RESPONSES TO PHQ9 QUESTIONS 1 & 2: 0
SUM OF ALL RESPONSES TO PHQ QUESTIONS 1-9: 2
9. THOUGHTS THAT YOU WOULD BE BETTER OFF DEAD, OR OF HURTING YOURSELF: NOT AT ALL
SUM OF ALL RESPONSES TO PHQ QUESTIONS 1-9: 2
10. IF YOU CHECKED OFF ANY PROBLEMS, HOW DIFFICULT HAVE THESE PROBLEMS MADE IT FOR YOU TO DO YOUR WORK, TAKE CARE OF THINGS AT HOME, OR GET ALONG WITH OTHER PEOPLE: NOT DIFFICULT AT ALL
5. POOR APPETITE OR OVEREATING: NOT AT ALL
1. LITTLE INTEREST OR PLEASURE IN DOING THINGS: NOT AT ALL
3. TROUBLE FALLING OR STAYING ASLEEP: SEVERAL DAYS
7. TROUBLE CONCENTRATING ON THINGS, SUCH AS READING THE NEWSPAPER OR WATCHING TELEVISION: NOT AT ALL
SUM OF ALL RESPONSES TO PHQ QUESTIONS 1-9: 2
2. FEELING DOWN, DEPRESSED OR HOPELESS: NOT AT ALL
4. FEELING TIRED OR HAVING LITTLE ENERGY: SEVERAL DAYS
SUM OF ALL RESPONSES TO PHQ QUESTIONS 1-9: 2

## 2024-10-02 ASSESSMENT — LIFESTYLE VARIABLES: HOW OFTEN DO YOU HAVE A DRINK CONTAINING ALCOHOL: 4 OR MORE TIMES A WEEK

## 2024-10-02 ASSESSMENT — ENCOUNTER SYMPTOMS
ABDOMINAL PAIN: 0
SHORTNESS OF BREATH: 0

## 2024-10-02 NOTE — PROGRESS NOTES
Subjective:      Patient ID: Dionne Bridges is a 64 y.o. female.    Follow up    Patient is coming for routine follow-up on chronic conditions.  She is compliant medication.  She decreased her Lexapro to 10 mg daily because she was running low since she missed an appointment.  She likes how she feels on Lexapro 10 mg, blood pressure is still controlled.  Not having major issues.  She is asking me to continue Lexapro 10 mg.  She is agreeable to increase to 20 mg if she needs it later.  PHQ-9 Total Score: 2 (10/2/2024 10:02 AM)  Thoughts that you would be better off dead, or of hurting yourself in some way: 0 (10/2/2024 10:02 AM)  Atorvastatin is tolerated, continue.  She declined EKG and eye exam for Medicare welcome evaluation.  He is complaining of chronic right knee and lower back pain.  Will check an x-ray of the right knee.  She can use Voltaren gel and lidocaine patches as needed.  I recommended physical therapy.  If no improvement then I would send her to Ortho.  Patient agreed.        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     GERD  pantoprazole (PROTONIX) 20 MG tablet  Referred to GI for further evaluation, by cardiology     Depression  No manic episodes reported.  PHQ-9 Total Score: 9 (9/7/2023  8:22 AM)  Thoughts that you would be better off dead, or of hurting yourself in some way: 0 (9/7/2023  8:22 AM)  PHQ-9 Total Score: 3 (11/7/2023  8:23 AM)  Thoughts that you would be better off dead, or of hurting yourself in some way: 0 (11/7/2023  8:23 AM)  PHQ-9 Total Score: 8 (12/5/2023  9:10 AM)  Thoughts that you would be better off dead, or of hurting yourself in some way: 0 (12/5/2023  9:10 AM)  PHQ-9 Total Score: 9 (2/5/2024 11:39 AM)  Thoughts that you would be better off dead, or of hurting yourself in some way: 0 (2/5/2024 11:39 AM)  PHQ-9 Total

## 2024-10-02 NOTE — PATIENT INSTRUCTIONS
PWA. If you have questions about a medical condition or this instruction, always ask your healthcare professional. [employer], [employer] disclaims any warranty or liability for your use of this information.           Starting a Weight Loss Plan: Care Instructions  Overview     It can be a challenge to lose weight. But your doctor can help you make a weight-loss plan that meets your needs.  You don't have to make a lot of big changes at once. A better idea might be to focus on small changes and stick with them. When those changes become habit, you can add a few more changes.  Some people find it helpful to take an exercise or nutrition class. If you have questions, ask your doctor about seeing a registered dietitian or an exercise specialist. You might also think about joining a weight-loss support group.  If you're not ready to make changes right now, try to pick a date in the future. Then make an appointment with your doctor to talk about when and how you'll get started with a plan.  Follow-up care is a key part of your treatment and safety. Be sure to make and go to all appointments, and call your doctor if you are having problems. It's also a good idea to know your test results and keep a list of the medicines you take.  How can you care for yourself at home?  Set realistic goals. Many people expect to lose much more weight than is likely. A weight loss of 5% to 10% of your body weight may be enough to improve your health.  Get family and friends involved to provide support. Talk to them about why you are trying to lose weight, and ask them to help. They can help by participating in exercise and having meals with you, even if they may be eating something different.  Find what works best for you. If you do not have time or do not like to cook, a program that offers meal replacement bars or shakes may be better for you. Or if you like to prepare meals, finding a plan that includes daily menus and  normal...

## 2024-10-02 NOTE — PROGRESS NOTES
Medicare Annual Wellness Visit    Dionne Bridges is here for Joint Pain, Depression, Weight Management, Hyperlipidemia, and Medicare AWV    Assessment & Plan   Hyperlipidemia, unspecified hyperlipidemia type  -     atorvastatin (LIPITOR) 20 MG tablet; Take 1 tablet by mouth daily, Disp-90 tablet, R-1Normal  Chronic right-sided low back pain without sciatica  -     lidocaine (LIDODERM) 5 %; Place 1 patch onto the skin daily 12 hours on, 12 hours off., Disp-30 patch, R-1Normal  -     [facility]  Major depressive disorder, recurrent, mild  Major depressive disorder, recurrent, moderate  -     escitalopram (LEXAPRO) 10 MG tablet; Take 1 tablet by mouth daily, Disp-90 tablet, R-0Normal  Chronic pain of right knee  -     XR KNEE RIGHT (1-2 VIEWS); Future  -     [facility]  ACP (advance care planning)  -     [place] Advanced Care Planning (16-30 minutes) [94262]  Welcome to Medicare preventive visit    Recommendations for Preventive Services Due: see orders and patient instructions/AVS.  Recommended screening schedule for the next 5-10 years is provided to the patient in written form: see Patient Instructions/AVS.     Return in about 3 months (around 1/2/2025) for Follow up, Treatment monitoring Depression - VIRTUAL.     Subjective       Patient's complete Health Risk Assessment and screening values have been reviewed and are found in Flowsheets. The following problems were reviewed today and where indicated follow up appointments were made and/or referrals ordered.    Positive Risk Factor Screenings with Interventions:               General HRA Questions:  Select all that apply: (!) New or Increased Pain, Stress, Anger, Social Isolation, Loneliness, New or Increased Fatigue  Interventions - Pain:  See AVS for additional education material  Interventions Fatigue:  See AVS for additional education material  Interventions -

## 2024-10-14 ENCOUNTER — HOSPITAL ENCOUNTER (OUTPATIENT)
Facility: HOSPITAL | Age: 64
Setting detail: RECURRING SERIES
Discharge: HOME | End: 2024-10-17
Payer: MEDICARE

## 2024-10-14 PROCEDURE — 97162 PT EVAL MOD COMPLEX 30 MIN: CPT

## 2024-10-14 NOTE — THERAPY EVALUATION
bilat; Hip ABDuction (sidelying SLR) 4/5 right with lateral hip pain, 5-/5 left OK; Hip Extension (hooklying) 5/5 and OK bilat; Hip ADDuction Not Tested.  Knee Flexion 4+ to 5-/5 right and slight discomfort, 5/5 left OK; Extension 5- to 5/5 and OK bilat.  Heel Raises in SLS 4/5 right and left, mildly increased knee discomfort each.  3. No/minimal tenderness to palpation to improve sleep, chores, transfers/transitions, and ADLs.   Status at IE:  tenderness right lateral and posterolateral gluteal areas; tender right medial knee area.  4. Pt able to squat to >/= 75% depth and perform sit <==> stand without hand use x 5 reps in </= 15 seconds.   Status at IE:  Squat to ~1/2 depth with bilat knee pain; Sit <==> Stand with hand use to push up, then weight shift forward onto feet, wide BENTLEY, reports right > left knee discomfort.    Frequency / Duration: Patient to be seen 2 times per week for 6-8 weeks.    Patient/ Caregiver education and instruction: Diagnosis, prognosis, self care, activity modification, brace/ splint application, and exercises [x]  Plan of care has been reviewed with PTA    Certification Period: 10/14/2024 to 12/13/2024.    Jed Angel, PT       10/14/2024       7:31 AM    Payor: MEDICARE / Plan: MEDICARE PART A AND B / Product Type: *No Product type* /     Physician signature required for Medicare, Medicaid, Worker's Comp, Direct Access   ===================================================================  I certify that the above Therapy Services are being furnished while the patient is under my care. I agree with the treatment plan and certify that this therapy is necessary.    Physician's Signature:_________________________   DATE:_________   TIME:________                           [place],*    ** Signature, Date and Time must be completed for valid certification **  [facility]. Thank you

## 2024-10-14 NOTE — PROGRESS NOTES
PHYSICAL / OCCUPATIONAL THERAPY - DAILY TREATMENT NOTE    Patient Name: Dionne Bridges    Date: 10/14/2024    : 1960  Insurance: Payor: MEDICARE / Plan: MEDICARE PART A AND B / Product Type: *No Product type* /      Patient  verified Yes     Visit #   Current / Total 1 16   Time   In / Out 1:00 1:55   Pain   In / Out 5/10 5/10   Subjective Functional Status/Changes: See Eval/POC.     TREATMENT AREA =  Other low back pain [M54.59]  Pain in right knee [M25.561]     OBJECTIVE    55 min [x]Eval - untimed                      Therapeutic Procedures:    Tx Min Billable or 1:1 Min (if diff from Tx Min) Procedure, Rationale, Specifics   0 0 [id number] Therapeutic Exercise (timed):  increase ROM, strength, coordination, balance, and proprioception to improve patient's ability to progress to PLOF and address remaining functional goals. (see flow sheet as applicable)     Details if applicable:            Details if applicable:            Details if applicable:            Details if applicable:            Details if applicable:     0 0 MC BC Totals Reminder: bill using total billable min of TIMED therapeutic procedures (example: do not include dry needle or estim unattended, both untimed codes, in totals to left)  8-22 min = 1 unit; 23-37 min = 2 units; 38-52 min = 3 units; 53-67 min = 4 units; 68-82 min = 5 units   Total Total     [x]  Patient Education billed concurrently with other procedures   [x] Review HEP    [] Progressed/Changed HEP, detail:    [] Other detail:       Objective Information/Functional Measures/Assessment    See Eval/POC.    Patient will continue to benefit from skilled PT / OT services to modify and progress therapeutic interventions, analyze and address functional mobility deficits, analyze and address ROM deficits, analyze and address strength deficits, analyze and address soft tissue restrictions, analyze and cue for proper movement patterns, analyze and modify for postural abnormalities,

## 2024-10-16 ENCOUNTER — HOSPITAL ENCOUNTER (OUTPATIENT)
Facility: HOSPITAL | Age: 64
Setting detail: RECURRING SERIES
Discharge: HOME | End: 2024-10-19
Payer: MEDICARE

## 2024-10-16 PROCEDURE — 97140 MANUAL THERAPY 1/> REGIONS: CPT

## 2024-10-16 PROCEDURE — G0283 ELEC STIM OTHER THAN WOUND: HCPCS

## 2024-10-16 PROCEDURE — 97110 THERAPEUTIC EXERCISES: CPT

## 2024-10-16 NOTE — PROGRESS NOTES
PHYSICAL / OCCUPATIONAL THERAPY - DAILY TREATMENT NOTE    Patient Name: Dionne Bridges    Date: 10/16/2024    : 1960  Insurance: Payor: MEDICARE / Plan: MEDICARE PART A AND B / Product Type: *No Product type* /      Patient  verified Yes     Visit #   Current / Total 2 16   Time   In / Out 225 310   Pain   In / Out 6 2   Subjective Functional Status/Changes: My lower back and (R) hip is pretty bad today.     TREATMENT AREA =  Other low back pain [M54.59]  Pain in right knee [M25.561]     OBJECTIVE    Modalities Rationale:     decrease inflammation, decrease pain, and increase tissue extensibility to improve patient's ability to progress to PLOF and address remaining functional goals.    10 min [x] Estim Unattended, type/location:  Prone with 2 pillows: (B) lower L/S and glutes IFC.                                    [x]  w/ice    []  w/heat    min [] Estim Attended, type/location:                                     []  w/US     []  w/ice    []  w/heat    []  TENS insruct      min []  Mechanical Traction: type/lbs                   []  pro   []  sup   []  int   []  cont    []  before manual    []  after manual    min []  Ultrasound, settings/location:      min  unbill []  Ice     []  Heat    location/position:     min []  Paraffin,  details:     min []  Vasopneumatic Device, press/temp:     min []  Whirlpool / Fluido:    If using vaso (only need to measure limb vaso being performed on)      pre-treatment girth :       post-treatment girth :       measured at (landmark location) :      min []  Other:    Skin assessment post-treatment:   Intact      Therapeutic Procedures:    Tx Min Billable or 1:1 Min (if diff from Tx Min) Procedure, Rationale, Specifics   15  [id number] Manual Therapy (timed):  decrease pain, increase ROM, and increase tissue extensibility to improve patient's ability to progress to PLOF and address remaining functional goals.  The manual therapy interventions were performed at a separate

## 2024-10-21 ENCOUNTER — HOSPITAL ENCOUNTER (OUTPATIENT)
Facility: HOSPITAL | Age: 64
Setting detail: RECURRING SERIES
Discharge: HOME | End: 2024-10-24
Payer: MEDICARE

## 2024-10-21 PROCEDURE — G0283 ELEC STIM OTHER THAN WOUND: HCPCS

## 2024-10-21 PROCEDURE — 97110 THERAPEUTIC EXERCISES: CPT

## 2024-10-21 PROCEDURE — 97140 MANUAL THERAPY 1/> REGIONS: CPT

## 2024-10-21 NOTE — PROGRESS NOTES
PHYSICAL / OCCUPATIONAL THERAPY - DAILY TREATMENT NOTE    Patient Name: Dionne Bridges    Date: 10/21/2024    : 1960  Insurance: Payor: MEDICARE / Plan: MEDICARE PART A AND B / Product Type: *No Product type* /      Patient  verified Yes     Visit #   Current / Total 3 16   Time   In / Out 220 310   Pain   In / Out 4 4   Subjective Functional Status/Changes: I don't know what's going on but my hip has had this really bad pain and it goes down my leg and in my hip.     TREATMENT AREA =  Other low back pain [M54.59]  Pain in right knee [M25.561]     OBJECTIVE    Modalities Rationale:     decrease inflammation, decrease pain, and increase tissue extensibility to improve patient's ability to progress to PLOF and address remaining functional goals.    10 min [x] Estim Unattended, type/location:  Prone with 2 pillows: (R) l glute IFC.                                    [x]  w/ice    []  w/heat    min [] Estim Attended, type/location:                                     []  w/US     []  w/ice    []  w/heat    []  TENS insruct      min []  Mechanical Traction: type/lbs                   []  pro   []  sup   []  int   []  cont    []  before manual    []  after manual    min []  Ultrasound, settings/location:      min  unbill []  Ice     []  Heat    location/position:     min []  Paraffin,  details:     min []  Vasopneumatic Device, press/temp:     min []  Whirlpool / Fluido:    If using vaso (only need to measure limb vaso being performed on)      pre-treatment girth :       post-treatment girth :       measured at (landmark location) :      min []  Other:    Skin assessment post-treatment:   Intact      Therapeutic Procedures:    Tx Min Billable or 1:1 Min (if diff from Tx Min) Procedure, Rationale, Specifics   15  [id number] Manual Therapy (timed):  decrease pain, increase ROM, and increase tissue extensibility to improve patient's ability to progress to PLOF and address remaining functional goals.  The manual

## 2024-10-23 ENCOUNTER — HOSPITAL ENCOUNTER (OUTPATIENT)
Facility: HOSPITAL | Age: 64
Setting detail: RECURRING SERIES
Discharge: HOME | End: 2024-10-26
Payer: MEDICARE

## 2024-10-23 PROCEDURE — 97110 THERAPEUTIC EXERCISES: CPT

## 2024-10-23 PROCEDURE — 97140 MANUAL THERAPY 1/> REGIONS: CPT

## 2024-10-23 PROCEDURE — G0283 ELEC STIM OTHER THAN WOUND: HCPCS

## 2024-10-23 PROCEDURE — 97535 SELF CARE MNGMENT TRAINING: CPT

## 2024-10-23 NOTE — PROGRESS NOTES
PHYSICAL / OCCUPATIONAL THERAPY - DAILY TREATMENT NOTE    Patient Name: Dionne Bridges    Date: 10/23/2024    : 1960  Insurance: Payor: MEDICARE / Plan: MEDICARE PART A AND B / Product Type: *No Product type* /      Patient  verified Yes     Visit #   Current / Total 4 16   Time   In / Out 220 310   Pain   In / Out 2 1   Subjective Functional Status/Changes: My hip area is really just no happy, but my knee seems to be better.  It's just that going up and down the stairs all the time.  My walking is still a problem.     TREATMENT AREA =  Other low back pain [M54.59]  Pain in right knee [M25.561]     OBJECTIVE    Modalities Rationale:     decrease inflammation, decrease pain, and increase tissue extensibility to improve patient's ability to progress to PLOF and address remaining functional goals.    10 min [x] Estim Unattended, type/location:  Prone with 2 pillows: (R) l glute IFC.                                    [x]  w/ice    []  w/heat    min [] Estim Attended, type/location:                                     []  w/US     []  w/ice    []  w/heat    []  TENS insruct      min []  Mechanical Traction: type/lbs                   []  pro   []  sup   []  int   []  cont    []  before manual    []  after manual    min []  Ultrasound, settings/location:      min  unbill []  Ice     []  Heat    location/position:     min []  Paraffin,  details:     min []  Vasopneumatic Device, press/temp:     min []  Whirlpool / Fluido:    If using vaso (only need to measure limb vaso being performed on)      pre-treatment girth :       post-treatment girth :       measured at (landmark location) :      min []  Other:    Skin assessment post-treatment:   Intact      Therapeutic Procedures:    Tx Min Billable or 1:1 Min (if diff from Tx Min) Procedure, Rationale, Specifics   15  79451 Manual Therapy (timed):  decrease pain, increase ROM, and increase tissue extensibility to improve patient's ability to progress to PLOF and

## 2024-10-28 ENCOUNTER — HOSPITAL ENCOUNTER (OUTPATIENT)
Facility: HOSPITAL | Age: 64
Setting detail: RECURRING SERIES
Discharge: HOME | End: 2024-10-31
Payer: MEDICARE

## 2024-10-28 PROCEDURE — G0283 ELEC STIM OTHER THAN WOUND: HCPCS

## 2024-10-28 PROCEDURE — 97140 MANUAL THERAPY 1/> REGIONS: CPT

## 2024-10-28 PROCEDURE — 97110 THERAPEUTIC EXERCISES: CPT

## 2024-10-28 NOTE — PROGRESS NOTES
PHYSICAL / OCCUPATIONAL THERAPY - DAILY TREATMENT NOTE    Patient Name: Dionne Bridges    Date: 10/28/2024    : 1960  Insurance: Payor: MEDICARE / Plan: MEDICARE PART A AND B / Product Type: *No Product type* /      Patient  verified Yes     Visit #   Current / Total 5 16   Time   In / Out 145 240   Pain   In / Out 3 1   Subjective Functional Status/Changes: Did some of the exercises.  Knee is clicking and falling out alignment from time to time.  But the hip is the thing that is really giving me the most problem.     TREATMENT AREA =  Other low back pain [M54.59]  Pain in right knee [M25.561]     OBJECTIVE    Modalities Rationale:     decrease inflammation, decrease pain, and increase tissue extensibility to improve patient's ability to progress to PLOF and address remaining functional goals.    10 min [x] Estim Unattended, type/location:  (L) SL: (R)l glute IFC.                                    [x]  w/ice    []  w/heat    min [] Estim Attended, type/location:                                     []  w/US     []  w/ice    []  w/heat    []  TENS insruct      min []  Mechanical Traction: type/lbs                   []  pro   []  sup   []  int   []  cont    []  before manual    []  after manual    min []  Ultrasound, settings/location:      min  unbill []  Ice     []  Heat    location/position:     min []  Paraffin,  details:     min []  Vasopneumatic Device, press/temp:     min []  Whirlpool / Fluido:    If using vaso (only need to measure limb vaso being performed on)      pre-treatment girth :       post-treatment girth :       measured at (landmark location) :      min []  Other:    Skin assessment post-treatment:   Intact      Therapeutic Procedures:    Tx Min Billable or 1:1 Min (if diff from Tx Min) Procedure, Rationale, Specifics   20  62814 Manual Therapy (timed):  decrease pain, increase ROM, and increase tissue extensibility to improve patient's ability to progress to PLOF and address remaining

## 2024-10-30 ENCOUNTER — HOSPITAL ENCOUNTER (OUTPATIENT)
Facility: HOSPITAL | Age: 64
Setting detail: RECURRING SERIES
Discharge: HOME | End: 2024-11-02
Payer: MEDICARE

## 2024-10-30 PROCEDURE — 97110 THERAPEUTIC EXERCISES: CPT

## 2024-10-30 PROCEDURE — 97140 MANUAL THERAPY 1/> REGIONS: CPT

## 2024-10-30 PROCEDURE — G0283 ELEC STIM OTHER THAN WOUND: HCPCS

## 2024-10-30 NOTE — PROGRESS NOTES
transfers/transitions, stairs.               Status at IE:  Hip Flexion 5/5 bilat, OK right and some discomfort left; Hip ER WFL bilat and OK; Hip IR WFL and OK bilat; Hip ABDuction (sidelying SLR) 4/5 right with lateral hip pain, 5-/5 left OK; Hip Extension (hooklying) 5/5 and OK bilat; Hip ADDuction Not Tested.  Knee Flexion 4+ to 5-/5 right and slight discomfort, 5/5 left OK; Extension 5- to 5/5 and OK bilat.  Heel Raises in SLS 4/5 right and left, mildly increased knee discomfort each.  3. No/minimal tenderness to palpation to improve sleep, chores, transfers/transitions, and ADLs.               Status at IE:  tenderness right lateral and posterolateral gluteal areas; tender right medial knee area.  4. Pt able to squat to >/= 75% depth and perform sit <==> stand without hand use x 5 reps in </= 15 seconds.               Status at IE:  Squat to ~1/2 depth with bilat knee pain; Sit <==> Stand with hand use to push up, then weight shift forward onto feet, wide BENTLEY, reports right > left knee discomfort.    Next PN/ RC due 11/14/24  Auth due (visit number/ date) med nec 12/31/24    PLAN  - Continue Plan of Care  - Upgrade activities as tolerated  - Discharge due to :      Geremias Dacosta PTA    10/30/2024    6:20 AM  If an interpreting service was utilized for treatment of this patient, the contents of this document represent the material reviewed with the patient via the .     Future Appointments   Date Time Provider Department Center   10/30/2024  2:20 PM [facility]   11/4/2024  2:20 PM Geremias Dacosta PTA [facility]   11/6/2024  2:20 PM Geremias Dacosta PTA [facility]   12/19/2024 11:00 AM Tanvi Cobb PA-C [facility]

## 2024-11-04 ENCOUNTER — HOSPITAL ENCOUNTER (OUTPATIENT)
Facility: HOSPITAL | Age: 64
Setting detail: RECURRING SERIES
Discharge: HOME | End: 2024-11-07
Payer: MEDICARE

## 2024-11-04 PROCEDURE — G0283 ELEC STIM OTHER THAN WOUND: HCPCS

## 2024-11-04 PROCEDURE — 97140 MANUAL THERAPY 1/> REGIONS: CPT

## 2024-11-04 PROCEDURE — 97110 THERAPEUTIC EXERCISES: CPT

## 2024-11-04 PROCEDURE — 97112 NEUROMUSCULAR REEDUCATION: CPT

## 2024-11-04 NOTE — PROGRESS NOTES
PHYSICAL / OCCUPATIONAL THERAPY - DAILY TREATMENT NOTE    Patient Name: Dionne Bridges    Date: 2024    : 1960  Insurance: Payor: MEDICARE / Plan: MEDICARE PART A AND B / Product Type: *No Product type* /      Patient  verified Yes     Visit #   Current / Total 7 16   Time   In / Out 140 240   Pain   In / Out 1 0   Subjective Functional Status/Changes: I was able to sleep more comfortably and I did wake up as much from pain     TREATMENT AREA =  Other low back pain [M54.59]  Pain in right knee [M25.561]     OBJECTIVE    Modalities Rationale:     decrease inflammation, decrease pain, and increase tissue extensibility to improve patient's ability to progress to PLOF and address remaining functional goals.    10 min [x] Estim Unattended, type/location:  (L) SL: (R)l glute IFC.                                    [x]  w/ice    []  w/heat    min [] Estim Attended, type/location:                                     []  w/US     []  w/ice    []  w/heat    []  TENS insruct      min []  Mechanical Traction: type/lbs                   []  pro   []  sup   []  int   []  cont    []  before manual    []  after manual    min []  Ultrasound, settings/location:      min  unbill []  Ice     []  Heat    location/position:     min []  Paraffin,  details:     min []  Vasopneumatic Device, press/temp:     min []  Whirlpool / Fluido:    If using vaso (only need to measure limb vaso being performed on)      pre-treatment girth :       post-treatment girth :       measured at (landmark location) :      min []  Other:    Skin assessment post-treatment:   Intact      Therapeutic Procedures:    Tx Min Billable or 1:1 Min (if diff from Tx Min) Procedure, Rationale, Specifics   15  [id number] Manual Therapy (timed):  decrease pain, increase ROM, and increase tissue extensibility to improve patient's ability to progress to PLOF and address remaining functional goals.  The manual therapy interventions were performed at a separate and

## 2024-11-06 ENCOUNTER — HOSPITAL ENCOUNTER (OUTPATIENT)
Facility: HOSPITAL | Age: 64
Setting detail: RECURRING SERIES
Discharge: HOME | End: 2024-11-09
Payer: MEDICARE

## 2024-11-06 PROCEDURE — 97110 THERAPEUTIC EXERCISES: CPT

## 2024-11-06 PROCEDURE — 97530 THERAPEUTIC ACTIVITIES: CPT

## 2024-11-06 PROCEDURE — 97112 NEUROMUSCULAR REEDUCATION: CPT

## 2024-11-06 NOTE — PROGRESS NOTES
discomfort left; Hip ER WFL bilat and OK; Hip IR WFL and OK bilat; Hip ABDuction (sidelying SLR) 4/5 right with lateral hip pain, 5-/5 left OK; Hip Extension (hooklying) 5/5 and OK bilat; Hip ADDuction Not Tested.  Knee Flexion 4+ to 5-/5 right and slight discomfort, 5/5 left OK; Extension 5- to 5/5 and OK bilat.  Heel Raises in SLS 4/5 right and left, mildly increased knee discomfort each.  3. No/minimal tenderness to palpation to improve sleep, chores, transfers/transitions, and ADLs.               Status at IE:  tenderness right lateral and posterolateral gluteal areas; tender right medial knee area.  4. Pt able to squat to >/= 75% depth and perform sit <==> stand without hand use x 5 reps in </= 15 seconds.               Status at IE:  Squat to ~1/2 depth with bilat knee pain; Sit <==> Stand with hand use to push up, then weight shift forward onto feet, wide BENTLEY, reports right > left knee discomfort.    Next PN/ RC due 11/14/24  Auth due (visit number/ date) med nec 12/31/24    PLAN  - Continue Plan of Care  - Upgrade activities as tolerated  - Discharge due to :      Geremias Dacosta PTA    11/6/2024    6:24 AM  If an interpreting service was utilized for treatment of this patient, the contents of this document represent the material reviewed with the patient via the .     Future Appointments   Date Time Provider Department Center   11/6/2024  2:20 PM Geremias Dacosta PTA [facility]   12/19/2024 11:00 AM Tanvi Cobb PA-C [facility]

## 2024-11-12 ENCOUNTER — HOSPITAL ENCOUNTER (OUTPATIENT)
Facility: HOSPITAL | Age: 64
Setting detail: RECURRING SERIES
Discharge: HOME | End: 2024-11-15
Payer: MEDICARE

## 2024-11-12 PROCEDURE — 97110 THERAPEUTIC EXERCISES: CPT

## 2024-11-12 PROCEDURE — 97530 THERAPEUTIC ACTIVITIES: CPT

## 2024-11-12 NOTE — PROGRESS NOTES
soft tissue restrictions, and analyze and cue for proper movement patterns to address functional deficits and attain remaining goals.     Progress toward goals / Updated goals:  [x]  See Progress Note/Recertification     Short Term Goals: To be accomplished in 3-4 weeks  Patient Independent and compliant with progressive HEP/Self-Care Routine.               Status at IE:  No HEP.               Pt reports compliance prior to recent lifting injury; no problems or questions.     Decrease max pain scale rating to </= 2/10.               Status at IE:  up to 9/10.  Max to 6/10.     Increase PROM for right knee flexion by >/= 10 deg for transfers and squats, ADLs, stairs, chores.               Status at IE:  right to 128 deg with discomfort and left to 140 deg and OK.   Knee PROM flexion slightly increased bilat to 143 left and 133 right.     Increase bilat trunk rotation AROM to decreased by </= 25% for ADLs, chores, sleeping, transitions.               Status at IE:  decreased ~50% to right with mid back discomfort and decreased ~75% to left with neck/upper scapular discomfort.   Rotation right WFL and to left decreased ~25%, OK for both.     Long Term Goals: To be accomplished in 6-8 weeks  Improve Oswestry score to </= 20 to 25%; improve LEFS score to >/= 50/80.               Status at IE:  Oswetry = 34%; LEFS = 39/80.   LEFS increased to 59/80; Oswestry decreased to 26%.  2. Increase LE MMT scores by >/= 1/3 grade and/or to >/= 5-/5 throughout and no/minimal discomfort for resistive testing for ADLs, chores, transfers/transitions, stairs.               Status at IE:  Hip Flexion 5/5 bilat, OK right and some discomfort left; Hip ER WFL bilat and OK; Hip IR WFL and OK bilat; Hip ABDuction (sidelying SLR) 4/5 right with lateral hip pain, 5-/5 left OK; Hip Extension (hooklying) 5/5 and OK bilat; Hip ADDuction Not Tested.  Knee Flexion 4+ to 5-/5 right and slight discomfort, 5/5 left OK; Extension 5- to 5/5 and OK bilat.

## 2024-11-13 NOTE — THERAPY RECERTIFICATION
both.     Long Term Goals: To be accomplished in 6-8 weeks  Improve Oswestry score to </= 20 to 25%; improve LEFS score to >/= 50/80.               Status at IE:  Oswetry = 34%; LEFS = 39/80.              LEFS increased to 59/80; Oswestry decreased to 26%.  2. Increase LE MMT scores by >/= 1/3 grade and/or to >/= 5-/5 throughout and no/minimal discomfort for resistive testing for ADLs, chores, transfers/transitions, stairs.               Status at IE:  Hip Flexion 5/5 bilat, OK right and some discomfort left; Hip ER WFL bilat and OK; Hip IR WFL and OK bilat; Hip ABDuction (sidelying SLR) 4/5 right with lateral hip pain, 5-/5 left OK; Hip Extension (hooklying) 5/5 and OK bilat; Hip ADDuction Not Tested.  Knee Flexion 4+ to 5-/5 right and slight discomfort, 5/5 left OK; Extension 5- to 5/5 and OK bilat.  Heel Raises in SLS 4/5 right and left, mildly increased knee discomfort each.              Hip Flex 5/5 left with some HS discomfort, 5/5 right OK; ER 5/5 left with a little knee discomfort, 5/5 right OK; IR 5/5 left with lateral hip discomfort, 5/5 right with a little lateral knee discomfort; ABDuction (sidelying SLR) 4+ to 5-/5 left with mild discomfort; 5-/5 right and OK, but bothers left hip (table contact pressure); Extension (hooklying) 5/5 and OK bilat; ADDuction Not Tested.  Knee Flexion 5/5 and OK bilat; Extension 5/5 left, the knee pain, 5/5 right and OK.  Ankle DF 5/5 and OK bilat.  Heel Raises in SLS 5/5 right with some lateral hip discomfort; 5-/5 left and OK.  3. No/minimal tenderness to palpation to improve sleep, chores, transfers/transitions, and ADLs.               Status at IE:  tenderness right lateral and posterolateral gluteal areas; tender right medial knee area.              some tenderness left lateral gluteal muscle, but OK posteriorly; minimally tender right medial knee area.  4. Pt able to squat to >/= 75% depth and perform sit <==> stand without hand use x 5 reps in </= 15 seconds.

## 2024-12-19 ENCOUNTER — OFFICE VISIT (OUTPATIENT)
Age: 64
End: 2024-12-19
Payer: MEDICARE

## 2024-12-19 VITALS
DIASTOLIC BLOOD PRESSURE: 68 MMHG | BODY MASS INDEX: 33.11 KG/M2 | SYSTOLIC BLOOD PRESSURE: 130 MMHG | HEIGHT: 66 IN | WEIGHT: 206 LBS | HEART RATE: 64 BPM | OXYGEN SATURATION: 97 %

## 2024-12-19 DIAGNOSIS — R01.1 CARDIAC MURMUR, UNSPECIFIED: Primary | ICD-10-CM

## 2024-12-19 DIAGNOSIS — I38 VALVULAR HEART DISEASE: ICD-10-CM

## 2024-12-19 PROCEDURE — G8399 PT W/DXA RESULTS DOCUMENT: HCPCS | Performed by: INTERNAL MEDICINE

## 2024-12-19 PROCEDURE — G8427 DOCREV CUR MEDS BY ELIG CLIN: HCPCS | Performed by: INTERNAL MEDICINE

## 2024-12-19 PROCEDURE — 3017F COLORECTAL CA SCREEN DOC REV: CPT | Performed by: INTERNAL MEDICINE

## 2024-12-19 PROCEDURE — 99214 OFFICE O/P EST MOD 30 MIN: CPT | Performed by: INTERNAL MEDICINE

## 2024-12-19 PROCEDURE — G8417 CALC BMI ABV UP PARAM F/U: HCPCS | Performed by: INTERNAL MEDICINE

## 2024-12-19 PROCEDURE — 1036F TOBACCO NON-USER: CPT | Performed by: INTERNAL MEDICINE

## 2024-12-19 PROCEDURE — 1090F PRES/ABSN URINE INCON ASSESS: CPT | Performed by: INTERNAL MEDICINE

## 2024-12-19 PROCEDURE — 1123F ACP DISCUSS/DSCN MKR DOCD: CPT | Performed by: INTERNAL MEDICINE

## 2024-12-19 PROCEDURE — G8484 FLU IMMUNIZE NO ADMIN: HCPCS | Performed by: INTERNAL MEDICINE

## 2024-12-19 ASSESSMENT — PATIENT HEALTH QUESTIONNAIRE - PHQ9
SUM OF ALL RESPONSES TO PHQ QUESTIONS 1-9: 0
SUM OF ALL RESPONSES TO PHQ9 QUESTIONS 1 & 2: 0
SUM OF ALL RESPONSES TO PHQ QUESTIONS 1-9: 0
1. LITTLE INTEREST OR PLEASURE IN DOING THINGS: NOT AT ALL
SUM OF ALL RESPONSES TO PHQ QUESTIONS 1-9: 0
2. FEELING DOWN, DEPRESSED OR HOPELESS: NOT AT ALL
SUM OF ALL RESPONSES TO PHQ QUESTIONS 1-9: 0

## 2024-12-19 NOTE — PROGRESS NOTES
[facility]    Dionne Bridges is 64 y.o. female with a history of depression, hyperlipidemia      Patient is here today for follow-up   Denies any hospital mission or ER visits since last time  Denies any resting or exertional chest pain or chest pressure to suggest angina or any dyspnea to suggest heart failure.   No presyncope or syncope  Denies any PND or LE edema.   Taking all medications regularly.     Past Medical History:   Diagnosis Date    Chronic back pain     Depression     GERD (gastroesophageal reflux disease) 2000    HLD (hyperlipidemia)     Obesity 2019    Osteoarthritis 2021    Sleep apnea     does not use cpap    Tobacco abuse, in remission        Review of Systems:  Cardiac symptoms as noted above in HPI. All others negative.    Current Outpatient Medications   Medication Sig    atorvastatin (LIPITOR) 20 MG tablet Take 1 tablet by mouth daily    escitalopram (LEXAPRO) 10 MG tablet Take 1 tablet by mouth daily    latanoprost (XALATAN) 0.005 % ophthalmic solution Place 1 drop into both eyes nightly    diclofenac sodium (VOLTAREN) 1 % GEL Apply thin layer over knees up to 4 times a day as needed for pain.    B Complex Vitamins (VITAMIN B COMPLEX) TABS Take 1 tablet by mouth daily    coenzyme Q10 200 MG CAPS capsule Take by mouth daily     No current facility-administered medications for this visit.       Past Surgical History:   Procedure Laterality Date    COLONOSCOPY N/A 5/21/2021    SCREENING COLONOSCOPY with hot snared polypectomy performed by Loly Jenkins MD at [facility] ENDOSCOPY    COLONOSCOPY      EYE SURGERY  scleral buckle, cataracts, macular hole repair    HEENT Left     retina re-attached & macular hole repair    HYSTERECTOMY (CERVIX STATUS UNKNOWN)      HYSTERECTOMY, TOTAL ABDOMINAL (CERVIX REMOVED)      MYOMECTOMY      TONSILLECTOMY         Allergies and Sensitivities:  Allergies   Allergen Reactions    Penicillins

## 2024-12-19 NOTE — PROGRESS NOTES
Identified pt with two pt identifiers(name and ). Reviewed record in preparation for visit and have obtained necessary documentation.    Dionne Bridges presents today for   Chief Complaint   Patient presents with    Follow-up     9m f/u       Pt denies DIZZINESS, SOB, CHEST PAIN/ PRESSURE, FATIGUE/WEAKNESS, HEADACHES, SWELLING.             Dionne Bridges preferred language for health care discussion is english/other.    Personal Protective Equipment:   Personal Protective Equipment was used including: mask-surgical and hands-gloves. Patient was placed on no precaution(s). Patient was not masked.    Precautions:   Patient currently on None  Patient currently roomed with door closed.    Is someone accompanying this pt? no    Is the patient using any DME equipment during OV? no    Depression Screenin/19/2024    10:51 AM 10/2/2024    10:02 AM 2024     1:07 PM 2024    11:39 AM 2023     9:10 AM 2023     9:09 AM 2023     8:23 AM   PHQ-9 Questionaire   Little interest or pleasure in doing things 0 0 3 1 1 1 0   Feeling down, depressed, or hopeless 0 0 3 2 1 1 1   Trouble falling or staying asleep, or sleeping too much  1 1 2 1  2   Feeling tired or having little energy  1 3 2 3  0   Poor appetite or overeating  0 1 1 0  0   Feeling bad about yourself - or that you are a failure or have let yourself or your family down  0 0 1 1  0   Trouble concentrating on things, such as reading the newspaper or watching television  0 1 0 1  0   Moving or speaking so slowly that other people could have noticed. Or the opposite - being so fidgety or restless that you have been moving around a lot more than usual  0 0 0 0  0   Thoughts that you would be better off dead, or of hurting yourself in some way  0 0 0 0  0   PHQ-9 Total Score 0 2 12 9 8 2 3   If you checked off any problems, how difficult have these problems made it for you to do your work, take care of things at home, or get along with

## 2025-02-10 DIAGNOSIS — F33.1 MAJOR DEPRESSIVE DISORDER, RECURRENT, MODERATE (HCC): ICD-10-CM

## 2025-02-10 NOTE — TELEPHONE ENCOUNTER
Last Appointment:  10/2/2024  Future Appointments   Date Time Provider Department Center   9/23/2025 10:00 AM [facility] AMB   12/30/2025  2:00 PM Tanvi Cobb PA-C [facility] BS AMB    Appointment reminder sent via [facility].  Return in about 3 months (around 1/2/2025) for Follow up, Treatment monitoring Depression - VIRTUAL.

## 2025-02-11 RX ORDER — ESCITALOPRAM OXALATE 10 MG/1
10 TABLET ORAL DAILY
Qty: 30 TABLET | Refills: 1 | Status: SHIPPED | OUTPATIENT
Start: 2025-02-11

## 2025-02-18 DIAGNOSIS — F33.1 MAJOR DEPRESSIVE DISORDER, RECURRENT, MODERATE (HCC): ICD-10-CM

## 2025-02-18 NOTE — TELEPHONE ENCOUNTER
Patient is calling back stating she misplaced her prescription on Lexapro.  Asking if a new Rx can please be sent in and authorized for the early refill.  Patient will contact her Ins Co to see if they will cover this.  She did call the pharmacy to verify she picked it up as she could not remember.

## 2025-02-22 RX ORDER — ESCITALOPRAM OXALATE 10 MG/1
10 TABLET ORAL DAILY
Qty: 30 TABLET | Refills: 1 | OUTPATIENT
Start: 2025-02-22

## 2025-03-23 DIAGNOSIS — E78.5 HYPERLIPIDEMIA, UNSPECIFIED HYPERLIPIDEMIA TYPE: ICD-10-CM

## 2025-03-24 RX ORDER — ATORVASTATIN CALCIUM 20 MG/1
20 TABLET, FILM COATED ORAL DAILY
Qty: 30 TABLET | Refills: 0 | OUTPATIENT
Start: 2025-03-24

## 2025-03-24 NOTE — TELEPHONE ENCOUNTER
I'm not sure if I sent this to you already, can you do BI through [facility] for the listed testing and then call family with OOP and coordinate?      -- brother and sister are going to be separate probands. If you can email [facility] after orders are placed and let them know that the parents blood draws are for both? You can create charts for parents if not already in EPIC Last Appointment:  10/2/2024  Future Appointments   Date Time Provider Department Center   9/23/2025 10:00 AM [facility] BS AMB   12/30/2025  2:00 PM Tanvi Cobb PA-C [facility] AMB

## 2025-04-19 DIAGNOSIS — F33.1 MAJOR DEPRESSIVE DISORDER, RECURRENT, MODERATE (HCC): ICD-10-CM

## 2025-04-21 RX ORDER — ESCITALOPRAM OXALATE 10 MG/1
TABLET ORAL
Qty: 30 TABLET | Refills: 1 | OUTPATIENT
Start: 2025-04-21

## 2025-04-24 SDOH — ECONOMIC STABILITY: INCOME INSECURITY: IN THE LAST 12 MONTHS, WAS THERE A TIME WHEN YOU WERE NOT ABLE TO PAY THE MORTGAGE OR RENT ON TIME?: NO

## 2025-04-24 SDOH — ECONOMIC STABILITY: FOOD INSECURITY: WITHIN THE PAST 12 MONTHS, THE FOOD YOU BOUGHT JUST DIDN'T LAST AND YOU DIDN'T HAVE MONEY TO GET MORE.: NEVER TRUE

## 2025-04-24 SDOH — ECONOMIC STABILITY: FOOD INSECURITY: WITHIN THE PAST 12 MONTHS, YOU WORRIED THAT YOUR FOOD WOULD RUN OUT BEFORE YOU GOT MONEY TO BUY MORE.: NEVER TRUE

## 2025-04-24 SDOH — ECONOMIC STABILITY: TRANSPORTATION INSECURITY
IN THE PAST 12 MONTHS, HAS THE LACK OF TRANSPORTATION KEPT YOU FROM MEDICAL APPOINTMENTS OR FROM GETTING MEDICATIONS?: NO

## 2025-04-25 ENCOUNTER — OFFICE VISIT (OUTPATIENT)
Facility: CLINIC | Age: 65
End: 2025-04-25
Payer: MEDICARE

## 2025-04-25 ENCOUNTER — HOSPITAL ENCOUNTER (OUTPATIENT)
Facility: HOSPITAL | Age: 65
Setting detail: SPECIMEN
Discharge: HOME | End: 2025-04-28
Payer: MEDICARE

## 2025-04-25 VITALS
HEART RATE: 62 BPM | BODY MASS INDEX: 33.94 KG/M2 | RESPIRATION RATE: 16 BRPM | TEMPERATURE: 97.3 F | WEIGHT: 211.2 LBS | DIASTOLIC BLOOD PRESSURE: 79 MMHG | SYSTOLIC BLOOD PRESSURE: 126 MMHG | HEIGHT: 66 IN | OXYGEN SATURATION: 98 %

## 2025-04-25 DIAGNOSIS — E78.5 HYPERLIPIDEMIA, UNSPECIFIED HYPERLIPIDEMIA TYPE: ICD-10-CM

## 2025-04-25 DIAGNOSIS — F33.1 MAJOR DEPRESSIVE DISORDER, RECURRENT, MODERATE (HCC): Primary | ICD-10-CM

## 2025-04-25 DIAGNOSIS — Z13.0 SCREENING FOR IRON DEFICIENCY ANEMIA: ICD-10-CM

## 2025-04-25 DIAGNOSIS — Z12.31 SCREENING MAMMOGRAM FOR BREAST CANCER: ICD-10-CM

## 2025-04-25 DIAGNOSIS — M17.0 OSTEOARTHRITIS OF BOTH KNEES, UNSPECIFIED OSTEOARTHRITIS TYPE: ICD-10-CM

## 2025-04-25 DIAGNOSIS — Z11.59 ENCOUNTER FOR HEPATITIS C SCREENING TEST FOR LOW RISK PATIENT: ICD-10-CM

## 2025-04-25 DIAGNOSIS — Z11.4 SCREENING FOR HIV (HUMAN IMMUNODEFICIENCY VIRUS): ICD-10-CM

## 2025-04-25 DIAGNOSIS — F33.1 MAJOR DEPRESSIVE DISORDER, RECURRENT, MODERATE (HCC): ICD-10-CM

## 2025-04-25 DIAGNOSIS — Z23 ENCOUNTER FOR VACCINATION: ICD-10-CM

## 2025-04-25 LAB
ALBUMIN SERPL-MCNC: 3.8 G/DL (ref 3.4–5)
ALBUMIN/GLOB SERPL: 1.2 (ref 0.8–1.7)
ALP SERPL-CCNC: 75 U/L (ref 45–117)
ALT SERPL-CCNC: 20 U/L (ref 13–56)
ANION GAP SERPL CALC-SCNC: 2 MMOL/L (ref 3–18)
AST SERPL-CCNC: 12 U/L (ref 10–38)
BASOPHILS # BLD: 0.03 K/UL (ref 0–0.1)
BASOPHILS NFR BLD: 0.7 % (ref 0–2)
BILIRUB SERPL-MCNC: 0.4 MG/DL (ref 0.2–1)
BUN SERPL-MCNC: 10 MG/DL (ref 7–18)
BUN/CREAT SERPL: 12 (ref 12–20)
CALCIUM SERPL-MCNC: 9.5 MG/DL (ref 8.5–10.1)
CHLORIDE SERPL-SCNC: 109 MMOL/L (ref 100–111)
CHOLEST SERPL-MCNC: 216 MG/DL
CO2 SERPL-SCNC: 29 MMOL/L (ref 21–32)
CREAT SERPL-MCNC: 0.86 MG/DL (ref 0.6–1.3)
DIFFERENTIAL METHOD BLD: ABNORMAL
EOSINOPHIL # BLD: 0.16 K/UL (ref 0–0.4)
EOSINOPHIL NFR BLD: 3.9 % (ref 0–5)
ERYTHROCYTE [DISTWIDTH] IN BLOOD BY AUTOMATED COUNT: 14 % (ref 11.6–14.5)
GLOBULIN SER CALC-MCNC: 3.1 G/DL (ref 2–4)
GLUCOSE SERPL-MCNC: 98 MG/DL (ref 74–99)
HCT VFR BLD AUTO: 40.6 % (ref 35–45)
HDLC SERPL-MCNC: 50 MG/DL (ref 40–60)
HDLC SERPL: 4.3 (ref 0–5)
HGB BLD-MCNC: 13 G/DL (ref 12–16)
IMM GRANULOCYTES # BLD AUTO: 0.01 K/UL (ref 0–0.04)
IMM GRANULOCYTES NFR BLD AUTO: 0.2 % (ref 0–0.5)
LDLC SERPL CALC-MCNC: 139.2 MG/DL (ref 0–100)
LIPID PANEL: ABNORMAL
LYMPHOCYTES # BLD: 1.73 K/UL (ref 0.9–3.6)
LYMPHOCYTES NFR BLD: 41.8 % (ref 21–52)
MCH RBC QN AUTO: 28.4 PG (ref 24–34)
MCHC RBC AUTO-ENTMCNC: 32 G/DL (ref 31–37)
MCV RBC AUTO: 88.6 FL (ref 78–100)
MONOCYTES # BLD: 0.35 K/UL (ref 0.05–1.2)
MONOCYTES NFR BLD: 8.5 % (ref 3–10)
NEUTS SEG # BLD: 1.86 K/UL (ref 1.8–8)
NEUTS SEG NFR BLD: 44.9 % (ref 40–73)
NRBC # BLD: 0 K/UL (ref 0–0.01)
NRBC BLD-RTO: 0 PER 100 WBC
PLATELET # BLD AUTO: 307 K/UL (ref 135–420)
PMV BLD AUTO: 10.9 FL (ref 9.2–11.8)
POTASSIUM SERPL-SCNC: 4.3 MMOL/L (ref 3.5–5.5)
PROT SERPL-MCNC: 6.9 G/DL (ref 6.4–8.2)
RBC # BLD AUTO: 4.58 M/UL (ref 4.2–5.3)
SODIUM SERPL-SCNC: 140 MMOL/L (ref 136–145)
TRIGL SERPL-MCNC: 134 MG/DL
VLDLC SERPL CALC-MCNC: 26.8 MG/DL
WBC # BLD AUTO: 4.1 K/UL (ref 4.6–13.2)

## 2025-04-25 PROCEDURE — G8427 DOCREV CUR MEDS BY ELIG CLIN: HCPCS | Performed by: INTERNAL MEDICINE

## 2025-04-25 PROCEDURE — 1090F PRES/ABSN URINE INCON ASSESS: CPT | Performed by: INTERNAL MEDICINE

## 2025-04-25 PROCEDURE — 1123F ACP DISCUSS/DSCN MKR DOCD: CPT | Performed by: INTERNAL MEDICINE

## 2025-04-25 PROCEDURE — 36415 COLL VENOUS BLD VENIPUNCTURE: CPT

## 2025-04-25 PROCEDURE — 86803 HEPATITIS C AB TEST: CPT

## 2025-04-25 PROCEDURE — 90677 PCV20 VACCINE IM: CPT | Performed by: INTERNAL MEDICINE

## 2025-04-25 PROCEDURE — 80061 LIPID PANEL: CPT

## 2025-04-25 PROCEDURE — G8417 CALC BMI ABV UP PARAM F/U: HCPCS | Performed by: INTERNAL MEDICINE

## 2025-04-25 PROCEDURE — G8399 PT W/DXA RESULTS DOCUMENT: HCPCS | Performed by: INTERNAL MEDICINE

## 2025-04-25 PROCEDURE — 3017F COLORECTAL CA SCREEN DOC REV: CPT | Performed by: INTERNAL MEDICINE

## 2025-04-25 PROCEDURE — 99214 OFFICE O/P EST MOD 30 MIN: CPT | Performed by: INTERNAL MEDICINE

## 2025-04-25 PROCEDURE — 80053 COMPREHEN METABOLIC PANEL: CPT

## 2025-04-25 PROCEDURE — G0009 ADMIN PNEUMOCOCCAL VACCINE: HCPCS | Performed by: INTERNAL MEDICINE

## 2025-04-25 PROCEDURE — 1036F TOBACCO NON-USER: CPT | Performed by: INTERNAL MEDICINE

## 2025-04-25 PROCEDURE — 85025 COMPLETE CBC W/AUTO DIFF WBC: CPT

## 2025-04-25 PROCEDURE — 87389 HIV-1 AG W/HIV-1&-2 AB AG IA: CPT

## 2025-04-25 RX ORDER — TIMOLOL MALEATE 2.5 MG/ML
1 SOLUTION/ DROPS OPHTHALMIC 2 TIMES DAILY
COMMUNITY

## 2025-04-25 RX ORDER — ATORVASTATIN CALCIUM 20 MG/1
20 TABLET, FILM COATED ORAL DAILY
Qty: 90 TABLET | Refills: 1 | Status: SHIPPED | OUTPATIENT
Start: 2025-04-25

## 2025-04-25 RX ORDER — ESCITALOPRAM OXALATE 20 MG/1
20 TABLET ORAL DAILY
Qty: 90 TABLET | Refills: 1 | Status: SHIPPED | OUTPATIENT
Start: 2025-04-25

## 2025-04-25 ASSESSMENT — PATIENT HEALTH QUESTIONNAIRE - PHQ9
7. TROUBLE CONCENTRATING ON THINGS, SUCH AS READING THE NEWSPAPER OR WATCHING TELEVISION: MORE THAN HALF THE DAYS
4. FEELING TIRED OR HAVING LITTLE ENERGY: MORE THAN HALF THE DAYS
1. LITTLE INTEREST OR PLEASURE IN DOING THINGS: SEVERAL DAYS
10. IF YOU CHECKED OFF ANY PROBLEMS, HOW DIFFICULT HAVE THESE PROBLEMS MADE IT FOR YOU TO DO YOUR WORK, TAKE CARE OF THINGS AT HOME, OR GET ALONG WITH OTHER PEOPLE: SOMEWHAT DIFFICULT
6. FEELING BAD ABOUT YOURSELF - OR THAT YOU ARE A FAILURE OR HAVE LET YOURSELF OR YOUR FAMILY DOWN: SEVERAL DAYS
9. THOUGHTS THAT YOU WOULD BE BETTER OFF DEAD, OR OF HURTING YOURSELF: NOT AT ALL
SUM OF ALL RESPONSES TO PHQ QUESTIONS 1-9: 9
3. TROUBLE FALLING OR STAYING ASLEEP: SEVERAL DAYS
8. MOVING OR SPEAKING SO SLOWLY THAT OTHER PEOPLE COULD HAVE NOTICED. OR THE OPPOSITE, BEING SO FIGETY OR RESTLESS THAT YOU HAVE BEEN MOVING AROUND A LOT MORE THAN USUAL: NOT AT ALL
SUM OF ALL RESPONSES TO PHQ QUESTIONS 1-9: 9
5. POOR APPETITE OR OVEREATING: SEVERAL DAYS
2. FEELING DOWN, DEPRESSED OR HOPELESS: SEVERAL DAYS

## 2025-04-25 ASSESSMENT — ENCOUNTER SYMPTOMS
SHORTNESS OF BREATH: 0
ABDOMINAL PAIN: 0

## 2025-04-25 NOTE — PROGRESS NOTES
Subjective:      Patient ID: Dionne Bridges is a 64 y.o. female.    Follow up    In a previous appointment patient decreased Lexapro to 10 mg daily.  She had been feeling very good on Lexapro 10 mg and she is willing to increase it to 20 mg as usual.  PHQ-9 Total Score: 9 (4/25/2025  8:12 AM)  Thoughts that you would be better off dead, or of hurting yourself in some way: 0 (4/25/2025  8:12 AM)  Starting the well-tolerated, will check LFTs and lipid panel.  She would like to have pregnant shot today.  Declined Tdap vaccination.  Mammogram screening order provided to patient per her request.  She complains of mild right knee pain, declined x-rays or physical therapy for now.  Exam consistent with mild OA, no concerns for trauma or infection.  No other complaints or concerns.        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     GERD  pantoprazole (PROTONIX) 20 MG tablet  Referred to GI for further evaluation, by cardiology     Depression  No manic episodes reported.  PHQ-9 Total Score: 9 (9/7/2023  8:22 AM)  Thoughts that you would be better off dead, or of hurting yourself in some way: 0 (9/7/2023  8:22 AM)  PHQ-9 Total Score: 3 (11/7/2023  8:23 AM)  Thoughts that you would be better off dead, or of hurting yourself in some way: 0 (11/7/2023  8:23 AM)  PHQ-9 Total Score: 8 (12/5/2023  9:10 AM)  Thoughts that you would be better off dead, or of hurting yourself in some way: 0 (12/5/2023  9:10 AM)  PHQ-9 Total Score: 9 (2/5/2024 11:39 AM)  Thoughts that you would be better off dead, or of hurting yourself in some way: 0 (2/5/2024 11:39 AM)  PHQ-9 Total Score: 2 (10/2/2024 10:02 AM)  Thoughts that you would be better off dead, or of hurting yourself in some way: 0 (10/2/2024 10:02 AM)  Restarted Lexapro 10 mg daily on 12/5/2024.  Increased to 20 mg on 4/29/24.

## 2025-04-26 LAB
HCV AB SERPL QL IA: NORMAL
HCV IGG SERPL QL IA: NON REACTIVE S/CO RATIO

## 2025-04-27 ENCOUNTER — RESULTS FOLLOW-UP (OUTPATIENT)
Facility: CLINIC | Age: 65
End: 2025-04-27

## 2025-04-28 LAB
HIV 1+2 AB+HIV1 P24 AG SERPL QL IA: NONREACTIVE
HIV 1/2 RESULT COMMENT: NORMAL

## 2025-06-10 ENCOUNTER — TELEMEDICINE (OUTPATIENT)
Facility: CLINIC | Age: 65
End: 2025-06-10
Payer: MEDICARE

## 2025-06-10 DIAGNOSIS — F33.0 MAJOR DEPRESSIVE DISORDER, RECURRENT, MILD: Primary | ICD-10-CM

## 2025-06-10 PROCEDURE — 3017F COLORECTAL CA SCREEN DOC REV: CPT | Performed by: INTERNAL MEDICINE

## 2025-06-10 PROCEDURE — 99212 OFFICE O/P EST SF 10 MIN: CPT | Performed by: INTERNAL MEDICINE

## 2025-06-10 PROCEDURE — 1036F TOBACCO NON-USER: CPT | Performed by: INTERNAL MEDICINE

## 2025-06-10 PROCEDURE — G8417 CALC BMI ABV UP PARAM F/U: HCPCS | Performed by: INTERNAL MEDICINE

## 2025-06-10 PROCEDURE — 1090F PRES/ABSN URINE INCON ASSESS: CPT | Performed by: INTERNAL MEDICINE

## 2025-06-10 PROCEDURE — 1123F ACP DISCUSS/DSCN MKR DOCD: CPT | Performed by: INTERNAL MEDICINE

## 2025-06-10 PROCEDURE — G8399 PT W/DXA RESULTS DOCUMENT: HCPCS | Performed by: INTERNAL MEDICINE

## 2025-06-10 PROCEDURE — G8427 DOCREV CUR MEDS BY ELIG CLIN: HCPCS | Performed by: INTERNAL MEDICINE

## 2025-06-10 ASSESSMENT — ENCOUNTER SYMPTOMS
ABDOMINAL PAIN: 0
SHORTNESS OF BREATH: 0

## 2025-06-10 NOTE — PROGRESS NOTES
Dionne Bridges, was evaluated through a synchronous (real-time) audio-video encounter. The patient (or guardian if applicable) is aware that this is a billable service, which includes applicable co-pays. This Virtual Visit was conducted with patient's (and/or legal guardian's) consent. Patient identification was verified, and a caregiver was present when appropriate.   The patient was located at Home: [address] [place] [id number]  Provider was located at Facility (Appt Dept): [address] [place] [phone]  Confirm you are appropriately licensed, registered, or certified to deliver care in the state where the patient is located as indicated above. If you are not or unsure, please re-schedule the visit: Yes, I confirm.     Dionne Bridges (:  1960) is a Established patient, presenting virtually for evaluation of the following:      Below is the assessment and plan developed based on review of pertinent history, physical exam, labs, studies, and medications.     Assessment & Plan  Major depressive disorder, recurrent, mild              Return in about 3 months (around 9/10/2025) for Follow up, Treatment monitoring depression - VIRTUAL.       Subjective   Follow up    Patient following up for depression treatment.  In previous appt Lexapro was increased to 20 mg as usual dose.  Her father passed away about 4 weeks ago.  Condolences were offered.  Patient comforted.  She feels sad but has seen time relief since she was in charge of taking care of her father and at some point was very demanding.  She is grieving, she feels Lexapro 20 mg is helping her, she feels more motivated and having a better rest at night.  Also reported better energy level.  She would like to continue 20 mg for now.  Denies suicidal thoughts, no intentions of hurting herself, no changes in her weight, lower appetite and lower energy but improving.        Left eye blindness  2/2 retinal detachment

## 2025-07-22 ENCOUNTER — CLINICAL DOCUMENTATION (OUTPATIENT)
Facility: CLINIC | Age: 65
End: 2025-07-22

## 2025-07-22 DIAGNOSIS — Z12.31 SCREENING MAMMOGRAM FOR BREAST CANCER: Primary | ICD-10-CM

## 2025-07-22 NOTE — PROGRESS NOTES
Mammogram screening from [facility].    Birads 1, negative.  Date of exam 7/10/2025. Sent to scan.  Ordered for next year.

## 2025-08-01 DIAGNOSIS — Z12.31 SCREENING MAMMOGRAM FOR BREAST CANCER: ICD-10-CM
